# Patient Record
Sex: FEMALE | Race: WHITE | NOT HISPANIC OR LATINO | Employment: PART TIME | URBAN - METROPOLITAN AREA
[De-identification: names, ages, dates, MRNs, and addresses within clinical notes are randomized per-mention and may not be internally consistent; named-entity substitution may affect disease eponyms.]

---

## 2017-03-02 ENCOUNTER — APPOINTMENT (EMERGENCY)
Dept: RADIOLOGY | Facility: HOSPITAL | Age: 52
End: 2017-03-02
Payer: COMMERCIAL

## 2017-03-02 ENCOUNTER — HOSPITAL ENCOUNTER (EMERGENCY)
Facility: HOSPITAL | Age: 52
Discharge: HOME | End: 2017-03-02
Attending: EMERGENCY MEDICINE | Admitting: EMERGENCY MEDICINE
Payer: COMMERCIAL

## 2017-03-02 VITALS
RESPIRATION RATE: 18 BRPM | TEMPERATURE: 97.7 F | SYSTOLIC BLOOD PRESSURE: 135 MMHG | DIASTOLIC BLOOD PRESSURE: 65 MMHG | BODY MASS INDEX: 34.96 KG/M2 | HEART RATE: 52 BPM | WEIGHT: 190 LBS | OXYGEN SATURATION: 99 % | HEIGHT: 62 IN

## 2017-03-02 DIAGNOSIS — R10.9 ABDOMINAL PAIN: Primary | ICD-10-CM

## 2017-03-02 LAB
ALBUMIN SERPL BCP-MCNC: 3.9 G/DL (ref 3.5–5)
ALP SERPL-CCNC: 51 U/L (ref 46–116)
ALT SERPL W P-5'-P-CCNC: 35 U/L (ref 12–78)
ANION GAP SERPL CALCULATED.3IONS-SCNC: 10 MMOL/L (ref 4–13)
AST SERPL W P-5'-P-CCNC: 26 U/L (ref 5–45)
BASOPHILS # BLD AUTO: 0.1 THOUSANDS/ΜL (ref 0–0.1)
BASOPHILS NFR BLD AUTO: 1 % (ref 0–1)
BILIRUB SERPL-MCNC: 0.2 MG/DL (ref 0.2–1)
BILIRUB UR QL STRIP: NEGATIVE
BUN SERPL-MCNC: 10 MG/DL (ref 5–25)
CALCIUM SERPL-MCNC: 8.9 MG/DL (ref 8.3–10.1)
CHLORIDE SERPL-SCNC: 104 MMOL/L (ref 100–108)
CLARITY UR: CLEAR
CO2 SERPL-SCNC: 28 MMOL/L (ref 21–32)
COLOR UR: NORMAL
CREAT SERPL-MCNC: 0.57 MG/DL (ref 0.6–1.3)
EOSINOPHIL # BLD AUTO: 0.1 THOUSAND/ΜL (ref 0–0.61)
EOSINOPHIL NFR BLD AUTO: 2 % (ref 0–6)
ERYTHROCYTE [DISTWIDTH] IN BLOOD BY AUTOMATED COUNT: 13.3 % (ref 11.6–15.1)
GFR SERPL CREATININE-BSD FRML MDRD: >60 ML/MIN/1.73SQ M
GLUCOSE SERPL-MCNC: 94 MG/DL (ref 65–140)
GLUCOSE UR STRIP-MCNC: NEGATIVE MG/DL
HCT VFR BLD AUTO: 38.7 % (ref 37–47)
HGB BLD-MCNC: 12.8 G/DL (ref 12–16)
HGB UR QL STRIP.AUTO: NEGATIVE
KETONES UR STRIP-MCNC: NEGATIVE MG/DL
LEUKOCYTE ESTERASE UR QL STRIP: NEGATIVE
LIPASE SERPL-CCNC: 133 U/L (ref 73–393)
LYMPHOCYTES # BLD AUTO: 4.1 THOUSANDS/ΜL (ref 0.6–4.47)
LYMPHOCYTES NFR BLD AUTO: 43 % (ref 14–44)
MCH RBC QN AUTO: 28.9 PG (ref 27–31)
MCHC RBC AUTO-ENTMCNC: 33.2 G/DL (ref 31.4–37.4)
MCV RBC AUTO: 87 FL (ref 82–98)
MONOCYTES # BLD AUTO: 0.5 THOUSAND/ΜL (ref 0.17–1.22)
MONOCYTES NFR BLD AUTO: 5 % (ref 4–12)
NEUTROPHILS # BLD AUTO: 4.7 THOUSANDS/ΜL (ref 1.85–7.62)
NEUTS SEG NFR BLD AUTO: 50 % (ref 43–75)
NITRITE UR QL STRIP: NEGATIVE
NRBC BLD AUTO-RTO: 0 /100 WBCS
PH UR STRIP.AUTO: 6.5 [PH] (ref 5–9)
PLATELET # BLD AUTO: 295 THOUSANDS/UL (ref 130–400)
PMV BLD AUTO: 8.4 FL (ref 8.9–12.7)
POTASSIUM SERPL-SCNC: 3.8 MMOL/L (ref 3.5–5.3)
PROT SERPL-MCNC: 7.6 G/DL (ref 6.4–8.2)
PROT UR STRIP-MCNC: NEGATIVE MG/DL
RBC # BLD AUTO: 4.44 MILLION/UL (ref 4.2–5.4)
SODIUM SERPL-SCNC: 142 MMOL/L (ref 136–145)
SP GR UR STRIP.AUTO: 1.01 (ref 1–1.03)
UROBILINOGEN UR QL STRIP.AUTO: 0.2 E.U./DL
WBC # BLD AUTO: 9.5 THOUSAND/UL (ref 4.8–10.8)

## 2017-03-02 PROCEDURE — 85025 COMPLETE CBC W/AUTO DIFF WBC: CPT | Performed by: EMERGENCY MEDICINE

## 2017-03-02 PROCEDURE — 74177 CT ABD & PELVIS W/CONTRAST: CPT

## 2017-03-02 PROCEDURE — 36415 COLL VENOUS BLD VENIPUNCTURE: CPT | Performed by: EMERGENCY MEDICINE

## 2017-03-02 PROCEDURE — 96374 THER/PROPH/DIAG INJ IV PUSH: CPT

## 2017-03-02 PROCEDURE — 80053 COMPREHEN METABOLIC PANEL: CPT | Performed by: EMERGENCY MEDICINE

## 2017-03-02 PROCEDURE — 96361 HYDRATE IV INFUSION ADD-ON: CPT

## 2017-03-02 PROCEDURE — 83690 ASSAY OF LIPASE: CPT | Performed by: EMERGENCY MEDICINE

## 2017-03-02 PROCEDURE — 96375 TX/PRO/DX INJ NEW DRUG ADDON: CPT

## 2017-03-02 PROCEDURE — 81003 URINALYSIS AUTO W/O SCOPE: CPT | Performed by: EMERGENCY MEDICINE

## 2017-03-02 PROCEDURE — 99284 EMERGENCY DEPT VISIT MOD MDM: CPT

## 2017-03-02 PROCEDURE — 87086 URINE CULTURE/COLONY COUNT: CPT | Performed by: EMERGENCY MEDICINE

## 2017-03-02 RX ORDER — DICYCLOMINE HCL 20 MG
20 TABLET ORAL 2 TIMES DAILY
Qty: 16 TABLET | Refills: 0 | Status: SHIPPED | OUTPATIENT
Start: 2017-03-02 | End: 2017-10-31

## 2017-03-02 RX ORDER — SODIUM CHLORIDE 9 MG/ML
125 INJECTION, SOLUTION INTRAVENOUS CONTINUOUS
Status: DISCONTINUED | OUTPATIENT
Start: 2017-03-02 | End: 2017-03-02 | Stop reason: HOSPADM

## 2017-03-02 RX ORDER — ONDANSETRON 2 MG/ML
4 INJECTION INTRAMUSCULAR; INTRAVENOUS ONCE
Status: COMPLETED | OUTPATIENT
Start: 2017-03-02 | End: 2017-03-02

## 2017-03-02 RX ORDER — ONDANSETRON 4 MG/1
4 TABLET, FILM COATED ORAL EVERY 6 HOURS
Qty: 12 TABLET | Refills: 0 | Status: SHIPPED | OUTPATIENT
Start: 2017-03-02 | End: 2017-10-31

## 2017-03-02 RX ORDER — FLUOXETINE 10 MG/1
10 TABLET, FILM COATED ORAL DAILY
COMMUNITY
End: 2017-10-31

## 2017-03-02 RX ORDER — KETOROLAC TROMETHAMINE 30 MG/ML
30 INJECTION, SOLUTION INTRAMUSCULAR; INTRAVENOUS ONCE
Status: COMPLETED | OUTPATIENT
Start: 2017-03-02 | End: 2017-03-02

## 2017-03-02 RX ORDER — FAMOTIDINE 20 MG/1
20 TABLET, FILM COATED ORAL 2 TIMES DAILY
Qty: 20 TABLET | Refills: 0 | Status: SHIPPED | OUTPATIENT
Start: 2017-03-02 | End: 2017-10-31

## 2017-03-02 RX ORDER — NAPROXEN 500 MG/1
500 TABLET ORAL 2 TIMES DAILY WITH MEALS
Qty: 14 TABLET | Refills: 0 | Status: SHIPPED | OUTPATIENT
Start: 2017-03-02 | End: 2017-10-31

## 2017-03-02 RX ADMIN — SODIUM CHLORIDE 125 ML/HR: 0.9 INJECTION, SOLUTION INTRAVENOUS at 17:58

## 2017-03-02 RX ADMIN — KETOROLAC TROMETHAMINE 30 MG: 30 INJECTION, SOLUTION INTRAMUSCULAR at 18:04

## 2017-03-02 RX ADMIN — IOHEXOL 100 ML: 350 INJECTION, SOLUTION INTRAVENOUS at 18:44

## 2017-03-02 RX ADMIN — ONDANSETRON 4 MG: 2 INJECTION INTRAMUSCULAR; INTRAVENOUS at 18:04

## 2017-03-04 LAB — BACTERIA UR CULT: NORMAL

## 2017-10-31 ENCOUNTER — APPOINTMENT (EMERGENCY)
Dept: RADIOLOGY | Facility: HOSPITAL | Age: 52
End: 2017-10-31
Payer: COMMERCIAL

## 2017-10-31 ENCOUNTER — HOSPITAL ENCOUNTER (EMERGENCY)
Facility: HOSPITAL | Age: 52
Discharge: HOME | End: 2017-10-31
Attending: EMERGENCY MEDICINE | Admitting: EMERGENCY MEDICINE
Payer: COMMERCIAL

## 2017-10-31 VITALS
TEMPERATURE: 98 F | OXYGEN SATURATION: 96 % | RESPIRATION RATE: 16 BRPM | DIASTOLIC BLOOD PRESSURE: 56 MMHG | SYSTOLIC BLOOD PRESSURE: 119 MMHG | HEART RATE: 56 BPM

## 2017-10-31 DIAGNOSIS — R07.9 CHEST PAIN, UNSPECIFIED TYPE: Primary | ICD-10-CM

## 2017-10-31 LAB
AMPHETAMINES SERPL QL SCN: NEGATIVE
ANION GAP SERPL CALCULATED.3IONS-SCNC: 8 MMOL/L (ref 4–13)
APTT PPP: 28 SECONDS (ref 24–33)
BARBITURATES UR QL: NEGATIVE
BASOPHILS # BLD AUTO: 0 THOUSANDS/ΜL (ref 0–0.1)
BASOPHILS NFR BLD AUTO: 0 % (ref 0–1)
BENZODIAZ UR QL: NEGATIVE
BILIRUB UR QL STRIP: NEGATIVE
BUN SERPL-MCNC: 14 MG/DL (ref 5–25)
CALCIUM SERPL-MCNC: 9.1 MG/DL (ref 8.3–10.1)
CHLORIDE SERPL-SCNC: 105 MMOL/L (ref 100–108)
CLARITY UR: NORMAL
CO2 SERPL-SCNC: 27 MMOL/L (ref 21–32)
COCAINE UR QL: NEGATIVE
COLOR UR: YELLOW
CREAT SERPL-MCNC: 0.47 MG/DL (ref 0.6–1.3)
EOSINOPHIL # BLD AUTO: 0.1 THOUSAND/ΜL (ref 0–0.61)
EOSINOPHIL NFR BLD AUTO: 2 % (ref 0–6)
ERYTHROCYTE [DISTWIDTH] IN BLOOD BY AUTOMATED COUNT: 13.1 % (ref 11.6–15.1)
GFR SERPL CREATININE-BSD FRML MDRD: 116 ML/MIN/1.73SQ M
GLUCOSE SERPL-MCNC: 99 MG/DL (ref 65–140)
GLUCOSE UR STRIP-MCNC: NEGATIVE MG/DL
HCT VFR BLD AUTO: 38.9 % (ref 37–47)
HGB BLD-MCNC: 13 G/DL (ref 12–16)
HGB UR QL STRIP.AUTO: NEGATIVE
INR PPP: 0.97 (ref 0.86–1.16)
KETONES UR STRIP-MCNC: NEGATIVE MG/DL
LEUKOCYTE ESTERASE UR QL STRIP: NEGATIVE
LYMPHOCYTES # BLD AUTO: 2.8 THOUSANDS/ΜL (ref 0.6–4.47)
LYMPHOCYTES NFR BLD AUTO: 36 % (ref 14–44)
MAGNESIUM SERPL-MCNC: 2.1 MG/DL (ref 1.6–2.6)
MCH RBC QN AUTO: 29.1 PG (ref 27–31)
MCHC RBC AUTO-ENTMCNC: 33.3 G/DL (ref 31.4–37.4)
MCV RBC AUTO: 87 FL (ref 82–98)
METHADONE UR QL: NEGATIVE
MONOCYTES # BLD AUTO: 0.5 THOUSAND/ΜL (ref 0.17–1.22)
MONOCYTES NFR BLD AUTO: 6 % (ref 4–12)
NEUTROPHILS # BLD AUTO: 4.3 THOUSANDS/ΜL (ref 1.85–7.62)
NEUTS SEG NFR BLD AUTO: 56 % (ref 43–75)
NITRITE UR QL STRIP: NEGATIVE
NRBC BLD AUTO-RTO: 0 /100 WBCS
OPIATES UR QL SCN: NEGATIVE
PCP UR QL: NEGATIVE
PH UR STRIP.AUTO: 6 [PH] (ref 5–9)
PLATELET # BLD AUTO: 253 THOUSANDS/UL (ref 130–400)
PMV BLD AUTO: 8.9 FL (ref 8.9–12.7)
POTASSIUM SERPL-SCNC: 4 MMOL/L (ref 3.5–5.3)
PROT UR STRIP-MCNC: NEGATIVE MG/DL
PROTHROMBIN TIME: 10.2 SECONDS (ref 9.4–11.7)
RBC # BLD AUTO: 4.46 MILLION/UL (ref 4.2–5.4)
SODIUM SERPL-SCNC: 140 MMOL/L (ref 136–145)
SP GR UR STRIP.AUTO: <=1.005 (ref 1–1.03)
THC UR QL: NEGATIVE
TROPONIN I SERPL-MCNC: <0.02 NG/ML
TROPONIN I SERPL-MCNC: <0.02 NG/ML
UROBILINOGEN UR QL STRIP.AUTO: 0.2 E.U./DL
WBC # BLD AUTO: 7.8 THOUSAND/UL (ref 4.8–10.8)

## 2017-10-31 PROCEDURE — 85730 THROMBOPLASTIN TIME PARTIAL: CPT | Performed by: EMERGENCY MEDICINE

## 2017-10-31 PROCEDURE — 80307 DRUG TEST PRSMV CHEM ANLYZR: CPT | Performed by: EMERGENCY MEDICINE

## 2017-10-31 PROCEDURE — 36415 COLL VENOUS BLD VENIPUNCTURE: CPT | Performed by: EMERGENCY MEDICINE

## 2017-10-31 PROCEDURE — 83735 ASSAY OF MAGNESIUM: CPT | Performed by: EMERGENCY MEDICINE

## 2017-10-31 PROCEDURE — 85610 PROTHROMBIN TIME: CPT | Performed by: EMERGENCY MEDICINE

## 2017-10-31 PROCEDURE — 81003 URINALYSIS AUTO W/O SCOPE: CPT | Performed by: EMERGENCY MEDICINE

## 2017-10-31 PROCEDURE — 71010 HB CHEST X-RAY 1 VIEW FRONTAL (PORTABLE): CPT

## 2017-10-31 PROCEDURE — 84484 ASSAY OF TROPONIN QUANT: CPT | Performed by: EMERGENCY MEDICINE

## 2017-10-31 PROCEDURE — 93005 ELECTROCARDIOGRAM TRACING: CPT | Performed by: EMERGENCY MEDICINE

## 2017-10-31 PROCEDURE — 96360 HYDRATION IV INFUSION INIT: CPT

## 2017-10-31 PROCEDURE — 96361 HYDRATE IV INFUSION ADD-ON: CPT

## 2017-10-31 PROCEDURE — 80048 BASIC METABOLIC PNL TOTAL CA: CPT | Performed by: EMERGENCY MEDICINE

## 2017-10-31 PROCEDURE — 85025 COMPLETE CBC W/AUTO DIFF WBC: CPT | Performed by: EMERGENCY MEDICINE

## 2017-10-31 PROCEDURE — 99285 EMERGENCY DEPT VISIT HI MDM: CPT

## 2017-10-31 RX ORDER — SODIUM CHLORIDE 9 MG/ML
125 INJECTION, SOLUTION INTRAVENOUS CONTINUOUS
Status: DISCONTINUED | OUTPATIENT
Start: 2017-10-31 | End: 2017-10-31 | Stop reason: HOSPADM

## 2017-10-31 RX ADMIN — SODIUM CHLORIDE 125 ML/HR: 0.9 INJECTION, SOLUTION INTRAVENOUS at 10:18

## 2017-10-31 NOTE — ED PROVIDER NOTES
History  Chief Complaint   Patient presents with    Chest Pain     patient states she had chest pains last night  Denies chest pain at this time  Denies other symptoms  21-year-old female presents to the ED with chest pain which she stated was a pounding in her chest and she had increased pain when she took a deep breath versus normal reading  No fevers no chills no other problems states she had been to the gym earlier  Patient states there is no pain now she is awake and alert does have a history of a psychiatric disorder with anxiety and has recently stopped her medication for that  She questions whether this might be part of the problem  Patient is a past smoker does not currently smoke no other medical issues no other risk factors other than family history where her mom had MI at 64  History provided by:  Patient   used: No        None       Past Medical History:   Diagnosis Date    Psychiatric disorder     anxiety       Past Surgical History:   Procedure Laterality Date     SECTION      HYSTERECTOMY      TONSILLECTOMY         History reviewed  No pertinent family history  I have reviewed and agree with the history as documented  Social History   Substance Use Topics    Smoking status: Current Every Day Smoker     Types: E-Cigarettes    Smokeless tobacco: Never Used    Alcohol use No        Review of Systems   Constitutional: Negative for activity change, chills, diaphoresis and fever  HENT: Negative for congestion, ear pain, nosebleeds, sore throat, trouble swallowing and voice change  Eyes: Negative for pain, discharge and redness  Respiratory: Negative for apnea, cough, choking, shortness of breath, wheezing and stridor  Cardiovascular: Positive for chest pain  Negative for palpitations  Gastrointestinal: Negative for abdominal distention, abdominal pain, constipation, diarrhea, nausea and vomiting  Endocrine: Negative for polydipsia  Genitourinary: Negative for difficulty urinating, dysuria, flank pain, frequency, hematuria and urgency  Musculoskeletal: Negative for back pain, gait problem, joint swelling, myalgias, neck pain and neck stiffness  Skin: Negative for pallor and rash  Neurological: Negative for dizziness, tremors, syncope, speech difficulty, weakness, numbness and headaches  Hematological: Negative for adenopathy  Psychiatric/Behavioral: Negative for confusion, hallucinations, self-injury and suicidal ideas  The patient is not nervous/anxious  Physical Exam  ED Triage Vitals [10/31/17 0844]   Temperature Pulse Respirations Blood Pressure SpO2   98 °F (36 7 °C) 63 16 130/80 98 %      Temp Source Heart Rate Source Patient Position - Orthostatic VS BP Location FiO2 (%)   Oral Monitor Lying Right arm --      Pain Score       No Pain           Orthostatic Vital Signs  Vitals:    10/31/17 1030 10/31/17 1137 10/31/17 1230 10/31/17 1232   BP: 126/61 134/76 119/56 119/56   Pulse: 56 58 60 56   Patient Position - Orthostatic VS: Sitting Sitting Sitting Lying       Physical Exam   Constitutional: She is oriented to person, place, and time  Vital signs are normal  She appears well-developed and well-nourished  HENT:   Head: Normocephalic and atraumatic  Right Ear: External ear normal    Left Ear: External ear normal    Nose: Nose normal    Mouth/Throat: Oropharynx is clear and moist    Eyes: Conjunctivae and EOM are normal  Pupils are equal, round, and reactive to light  Neck: Normal range of motion  Neck supple  Cardiovascular: Normal rate, regular rhythm, normal heart sounds and intact distal pulses  Pulmonary/Chest: Effort normal and breath sounds normal    Abdominal: Soft  Bowel sounds are normal    Musculoskeletal: Normal range of motion  Neurological: She is alert and oriented to person, place, and time  Skin: Skin is warm  Psychiatric: She has a normal mood and affect     Nursing note and vitals reviewed  ED Medications  Medications   sodium chloride 0 9 % infusion (125 mL/hr Intravenous New Bag 10/31/17 1018)       Diagnostic Studies  Results Reviewed     Procedure Component Value Units Date/Time    Troponin I [[id number]]  (Normal) Collected:  10/31/17 1237    Lab Status:  Final result Specimen:  Blood from Arm, Right Updated:  10/31/17 1310     Troponin I <0 02 ng/mL     Narrative:         Siemens Chemistry analyzer 99% cutoff is > 0 04 ng/mL in network labs    o cTnI 99% cutoff is useful only when applied to patients in the clinical setting of myocardial ischemia  o cTnI 99% cutoff should be interpreted in the context of clinical history, ECG findings and possibly cardiac imaging to establish correct diagnosis  o cTnI 99% cutoff may be suggestive but clearly not indicative of a coronary event without the clinical setting of myocardial ischemia  Rapid drug screen, urine [[id number]]  (Normal) Collected:  10/31/17 1017    Lab Status:  Final result Specimen:  Urine from Urine, Clean Catch Updated:  10/31/17 1120     Amph/Meth UR Negative     Barbiturate Ur Negative     Benzodiazepine Urine Negative     Cocaine Urine Negative     Methadone Urine Negative     Opiate Urine Negative     PCP Ur Negative     THC Urine Negative    Narrative:         FOR MEDICAL PURPOSES ONLY  IF CONFIRMATION NEEDED PLEASE CONTACT THE LAB WITHIN 5 DAYS  Drug Screen Cutoff Levels:  AMPHETAMINE/METHAMPHETAMINES  1000 ng/mL  BARBITURATES     200 ng/mL  BENZODIAZEPINES     200 ng/mL  COCAINE      300 ng/mL  METHADONE      300 ng/mL  OPIATES      300 ng/mL  PHENCYCLIDINE     25 ng/mL  THC       50 ng/mL    APTT [[id number]]  (Normal) Collected:  10/31/17 0943    Lab Status:  Final result Specimen:  Blood from Hand, Left Updated:  10/31/17 1039     PTT 28 seconds     Narrative:          Therapeutic Heparin Range = 60-90 seconds    Protime-INR [[id number]]  (Normal) Collected:  10/31/17 0943    Lab Status:  Final result Specimen: Blood from Hand, Left Updated:  10/31/17 1039     Protime 10 2 seconds      INR 0 97    UA w Reflex to Microscopic [[id number]]  (Normal) Collected:  10/31/17 1017    Lab Status:  Final result Specimen:  Urine from Urine, Clean Catch Updated:  10/31/17 1038     Color, UA Yellow     Clarity, UA Slightly Cloudy     Specific Gravity, UA <=1 005     pH, UA 6 0     Leukocytes, UA Negative     Nitrite, UA Negative     Protein, UA Negative mg/dl      Glucose, UA Negative mg/dl      Ketones, UA Negative mg/dl      Urobilinogen, UA 0 2 E U /dl      Bilirubin, UA Negative     Blood, UA Negative    Troponin I [[id number]]  (Normal) Collected:  10/31/17 0943    Lab Status:  Final result Specimen:  Blood from Hand, Left Updated:  10/31/17 1017     Troponin I <0 02 ng/mL     Narrative:         Siemens Chemistry analyzer 99% cutoff is > 0 04 ng/mL in network labs    o cTnI 99% cutoff is useful only when applied to patients in the clinical setting of myocardial ischemia  o cTnI 99% cutoff should be interpreted in the context of clinical history, ECG findings and possibly cardiac imaging to establish correct diagnosis  o cTnI 99% cutoff may be suggestive but clearly not indicative of a coronary event without the clinical setting of myocardial ischemia  Basic metabolic panel [[id number]]  (Abnormal) Collected:  10/31/17 0943    Lab Status:  Final result Specimen:  Blood from Hand, Left Updated:  10/31/17 1007     Sodium 140 mmol/L      Potassium 4 0 mmol/L      Chloride 105 mmol/L      CO2 27 mmol/L      Anion Gap 8 mmol/L      BUN 14 mg/dL      Creatinine 0 47 (L) mg/dL      Glucose 99 mg/dL      Calcium 9 1 mg/dL      eGFR 116 ml/min/1 73sq m     Narrative:         National Kidney Disease Education Program recommendations are as follows:  GFR calculation is accurate only with a steady state creatinine  Chronic Kidney disease less than 60 ml/min/1 73 sq  meters  Kidney failure less than 15 ml/min/1 73 sq  meters      Magnesium [[id number]]  (Normal) Collected:  10/31/17 0943    Lab Status:  Final result Specimen:  Blood from Hand, Left Updated:  10/31/17 1007     Magnesium 2 1 mg/dL     CBC and differential [[id number]]  (Normal) Collected:  10/31/17 0943    Lab Status:  Final result Specimen:  Blood from Hand, Left Updated:  10/31/17 1005     WBC 7 80 Thousand/uL      RBC 4 46 Million/uL      Hemoglobin 13 0 g/dL      Hematocrit 38 9 %      MCV 87 fL      MCH 29 1 pg      MCHC 33 3 g/dL      RDW 13 1 %      MPV 8 9 fL      Platelets 180 Thousands/uL      nRBC 0 /100 WBCs      Neutrophils Relative 56 %      Lymphocytes Relative 36 %      Monocytes Relative 6 %      Eosinophils Relative 2 %      Basophils Relative 0 %      Neutrophils Absolute 4 30 Thousands/µL      Lymphocytes Absolute 2 80 Thousands/µL      Monocytes Absolute 0 50 Thousand/µL      Eosinophils Absolute 0 10 Thousand/µL      Basophils Absolute 0 00 Thousands/µL                  XR chest 1 view portable   Final Result by Hue Maldonado MD (10/31 1041)      Minimal scarring or atelectasis at the left lung base           Workstation performed: [id number]                    Procedures  Procedures       Phone Contacts  ED Phone Contact    ED Course  ED Course as of Oct 31 1317   Tue Oct 31, 2017   1127 Glucose, UA: Negative         HEART Risk Score    Flowsheet Row Most Recent Value   History  1 Filed at: 10/31/2017 1146   ECG  0 Filed at: 10/31/2017 1146   Age  1 Filed at: 10/31/2017 1146   Risk Factors  1 Filed at: 10/31/2017 1146   Troponin  0 Filed at: 10/31/2017 1146   Heart Score Risk Calculator   History  1 Filed at: 10/31/2017 1146   ECG  0 Filed at: 10/31/2017 1146   Age  1 Filed at: 10/31/2017 1146   Risk Factors  1 Filed at: 10/31/2017 1146   Troponin  0 Filed at: 10/31/2017 1146   HEART Score  3 Filed at: 10/31/2017 1146   HEART Score  3 Filed at: 10/31/2017 1146                            MDM  Number of Diagnoses or Management Options  Chest pain, unspecified type:   Diagnosis management comments: Patient has heart score of 3 year Less  Patient has had a 2nd troponin which is still negative and she has remained pain-free while in the ED  So she is comfortable with following up with Dr Emma Cooper as an outpatient  CritCare Time    Disposition  Final diagnoses:   Chest pain, unspecified type     Time reflects when diagnosis was documented in both MDM as applicable and the Disposition within this note     Time User Action Codes Description Comment    10/31/2017  1:13 PM Blaine Kulkarni Add [R07 9] Chest pain, unspecified type       ED Disposition     ED Disposition Condition Comment    Discharge  Wayne Dior discharge to home/self care  Condition at discharge: Stable        Follow-up Information     Follow up With Specialties Details Why Contact Info    Ramo Horan MD Internal Medicine  [facility] [address]  [phone]          Patient's Medications   Discharge Prescriptions    No medications on file     No discharge procedures on file      ED Provider  Electronically Signed by           Gurmeet Guadarrama DO  10/31/17 7566

## 2017-10-31 NOTE — ED TRIAGE NOTES
Patient states she was previously on Prozac  States she weaned herself off of it, because she does not like the word Prozac  Was given xanax, but does not take it

## 2017-10-31 NOTE — DISCHARGE INSTRUCTIONS

## 2017-11-02 LAB
ATRIAL RATE: 68 BPM
P AXIS: 81 DEGREES
PR INTERVAL: 126 MS
QRS AXIS: 51 DEGREES
QRSD INTERVAL: 78 MS
QT INTERVAL: 398 MS
QTC INTERVAL: 423 MS
T WAVE AXIS: 50 DEGREES
VENTRICULAR RATE: 68 BPM

## 2017-12-07 ENCOUNTER — TRANSCRIBE ORDERS (OUTPATIENT)
Dept: ADMINISTRATIVE | Facility: HOSPITAL | Age: 52
End: 2017-12-07

## 2017-12-07 DIAGNOSIS — Z12.31 ENCOUNTER FOR SCREENING MAMMOGRAM FOR MALIGNANT NEOPLASM OF BREAST: Primary | ICD-10-CM

## 2017-12-13 ENCOUNTER — GENERIC CONVERSION - ENCOUNTER (OUTPATIENT)
Dept: OTHER | Facility: OTHER | Age: 52
End: 2017-12-13

## 2017-12-13 ENCOUNTER — HOSPITAL ENCOUNTER (OUTPATIENT)
Dept: RADIOLOGY | Facility: HOSPITAL | Age: 52
Discharge: HOME | End: 2017-12-13
Attending: INTERNAL MEDICINE
Payer: COMMERCIAL

## 2017-12-13 DIAGNOSIS — Z12.31 ENCOUNTER FOR SCREENING MAMMOGRAM FOR MALIGNANT NEOPLASM OF BREAST: ICD-10-CM

## 2017-12-13 PROCEDURE — G0202 SCR MAMMO BI INCL CAD: HCPCS

## 2018-07-22 ENCOUNTER — APPOINTMENT (EMERGENCY)
Dept: RADIOLOGY | Facility: HOSPITAL | Age: 53
End: 2018-07-22
Payer: COMMERCIAL

## 2018-07-22 ENCOUNTER — HOSPITAL ENCOUNTER (EMERGENCY)
Facility: HOSPITAL | Age: 53
Discharge: HOME | End: 2018-07-22
Attending: EMERGENCY MEDICINE | Admitting: EMERGENCY MEDICINE
Payer: COMMERCIAL

## 2018-07-22 VITALS
OXYGEN SATURATION: 99 % | SYSTOLIC BLOOD PRESSURE: 134 MMHG | RESPIRATION RATE: 23 BRPM | WEIGHT: 191.8 LBS | DIASTOLIC BLOOD PRESSURE: 71 MMHG | TEMPERATURE: 98.1 F | BODY MASS INDEX: 35.08 KG/M2 | HEART RATE: 62 BPM

## 2018-07-22 DIAGNOSIS — R07.89 ATYPICAL CHEST PAIN: Primary | ICD-10-CM

## 2018-07-22 DIAGNOSIS — R09.1 PLEURISY: ICD-10-CM

## 2018-07-22 LAB
ALBUMIN SERPL BCP-MCNC: 3.7 G/DL (ref 3.5–5)
ALP SERPL-CCNC: 43 U/L (ref 46–116)
ALT SERPL W P-5'-P-CCNC: 39 U/L (ref 12–78)
ANION GAP SERPL CALCULATED.3IONS-SCNC: 4 MMOL/L (ref 4–13)
APTT PPP: 28 SECONDS (ref 24–33)
AST SERPL W P-5'-P-CCNC: 25 U/L (ref 5–45)
ATRIAL RATE: 75 BPM
BASOPHILS # BLD AUTO: 0.05 THOUSANDS/ΜL (ref 0–0.1)
BASOPHILS NFR BLD AUTO: 1 % (ref 0–1)
BILIRUB SERPL-MCNC: 0.3 MG/DL (ref 0.2–1)
BUN SERPL-MCNC: 11 MG/DL (ref 5–25)
CALCIUM SERPL-MCNC: 9.3 MG/DL (ref 8.3–10.1)
CHLORIDE SERPL-SCNC: 105 MMOL/L (ref 100–108)
CO2 SERPL-SCNC: 29 MMOL/L (ref 21–32)
CREAT SERPL-MCNC: 0.56 MG/DL (ref 0.6–1.3)
EOSINOPHIL # BLD AUTO: 0.09 THOUSAND/ΜL (ref 0–0.61)
EOSINOPHIL NFR BLD AUTO: 1 % (ref 0–6)
ERYTHROCYTE [DISTWIDTH] IN BLOOD BY AUTOMATED COUNT: 12.3 % (ref 11.6–15.1)
GFR SERPL CREATININE-BSD FRML MDRD: 108 ML/MIN/1.73SQ M
GLUCOSE SERPL-MCNC: 108 MG/DL (ref 65–140)
HCT VFR BLD AUTO: 39.7 % (ref 34.8–46.1)
HGB BLD-MCNC: 12.9 G/DL (ref 11.5–15.4)
IMM GRANULOCYTES # BLD AUTO: 0.03 THOUSAND/UL (ref 0–0.2)
IMM GRANULOCYTES NFR BLD AUTO: 0 % (ref 0–2)
INR PPP: 0.95 (ref 0.86–1.16)
LYMPHOCYTES # BLD AUTO: 2.89 THOUSANDS/ΜL (ref 0.6–4.47)
LYMPHOCYTES NFR BLD AUTO: 36 % (ref 14–44)
MCH RBC QN AUTO: 28.8 PG (ref 26.8–34.3)
MCHC RBC AUTO-ENTMCNC: 32.5 G/DL (ref 31.4–37.4)
MCV RBC AUTO: 89 FL (ref 82–98)
MONOCYTES # BLD AUTO: 0.63 THOUSAND/ΜL (ref 0.17–1.22)
MONOCYTES NFR BLD AUTO: 8 % (ref 4–12)
NEUTROPHILS # BLD AUTO: 4.27 THOUSANDS/ΜL (ref 1.85–7.62)
NEUTS SEG NFR BLD AUTO: 54 % (ref 43–75)
NRBC BLD AUTO-RTO: 0 /100 WBCS
P AXIS: 70 DEGREES
PLATELET # BLD AUTO: 270 THOUSANDS/UL (ref 149–390)
PMV BLD AUTO: 10.5 FL (ref 8.9–12.7)
POTASSIUM SERPL-SCNC: 3.6 MMOL/L (ref 3.5–5.3)
PR INTERVAL: 122 MS
PROT SERPL-MCNC: 7.7 G/DL (ref 6.4–8.2)
PROTHROMBIN TIME: 10 SECONDS (ref 9.4–11.7)
QRS AXIS: 44 DEGREES
QRSD INTERVAL: 78 MS
QT INTERVAL: 382 MS
QTC INTERVAL: 426 MS
RBC # BLD AUTO: 4.48 MILLION/UL (ref 3.81–5.12)
SODIUM SERPL-SCNC: 138 MMOL/L (ref 136–145)
T WAVE AXIS: 63 DEGREES
TROPONIN I SERPL-MCNC: <0.02 NG/ML
VENTRICULAR RATE: 75 BPM
WBC # BLD AUTO: 7.96 THOUSAND/UL (ref 4.31–10.16)

## 2018-07-22 PROCEDURE — 36415 COLL VENOUS BLD VENIPUNCTURE: CPT

## 2018-07-22 PROCEDURE — 74175 CTA ABDOMEN W/CONTRAST: CPT

## 2018-07-22 PROCEDURE — 84484 ASSAY OF TROPONIN QUANT: CPT

## 2018-07-22 PROCEDURE — 85610 PROTHROMBIN TIME: CPT

## 2018-07-22 PROCEDURE — 96361 HYDRATE IV INFUSION ADD-ON: CPT

## 2018-07-22 PROCEDURE — 99285 EMERGENCY DEPT VISIT HI MDM: CPT

## 2018-07-22 PROCEDURE — 80053 COMPREHEN METABOLIC PANEL: CPT

## 2018-07-22 PROCEDURE — 96360 HYDRATION IV INFUSION INIT: CPT

## 2018-07-22 PROCEDURE — 85730 THROMBOPLASTIN TIME PARTIAL: CPT

## 2018-07-22 PROCEDURE — 93005 ELECTROCARDIOGRAM TRACING: CPT

## 2018-07-22 PROCEDURE — 93010 ELECTROCARDIOGRAM REPORT: CPT | Performed by: INTERNAL MEDICINE

## 2018-07-22 PROCEDURE — 85025 COMPLETE CBC W/AUTO DIFF WBC: CPT

## 2018-07-22 PROCEDURE — 71275 CT ANGIOGRAPHY CHEST: CPT

## 2018-07-22 RX ORDER — NAPROXEN 500 MG/1
500 TABLET ORAL 2 TIMES DAILY WITH MEALS
Qty: 10 TABLET | Refills: 0 | Status: SHIPPED | OUTPATIENT
Start: 2018-07-22 | End: 2018-07-31 | Stop reason: SDUPTHER

## 2018-07-22 RX ORDER — NAPROXEN 500 MG/1
500 TABLET ORAL ONCE
Status: COMPLETED | OUTPATIENT
Start: 2018-07-22 | End: 2018-07-22

## 2018-07-22 RX ADMIN — SODIUM CHLORIDE 1000 ML: 0.9 INJECTION, SOLUTION INTRAVENOUS at 16:10

## 2018-07-22 RX ADMIN — NAPROXEN 500 MG: 500 TABLET ORAL at 18:27

## 2018-07-22 RX ADMIN — IOHEXOL 100 ML: 350 INJECTION, SOLUTION INTRAVENOUS at 16:45

## 2018-07-22 NOTE — ED NOTES
Patient was transferred via stretcher to CT and returned via stretcher to room from CT at this time       Bhargav Yang RN  07/22/18 4529

## 2018-07-22 NOTE — DISCHARGE INSTRUCTIONS
Pleurisy   WHAT YOU NEED TO KNOW:   Pleurisy happens when the pleura becomes irritated or inflamed  The pleura are 2 thin layers of tissue that surround your lungs and line the inside of your chest cavity  There is a small amount of fluid between the pleura that helps the layers move easily when you breathe  When the pleura is irritated or swollen, the layers rub together as you breathe  DISCHARGE INSTRUCTIONS:   Return to the emergency department if:   · You have a fever  · You have shortness of breath  · Your lips or fingernails turn dusky or blue  · You have sudden, intense chest pain that feels different from your symptoms  · You are confused or feel like you are going to faint  Contact your healthcare provider if:   · Your pain gets worse, even after treatment  · You begin to cough up yellow, green, gray, or bloody mucus  · Your wound has redness, warmth, or drainage  · You have questions or concerns about your condition or care  Medicines: You may  receive any of the following:  · Cough medicine  helps decrease your urge to cough  A cough suppressant may help if a dry cough is causing you pain  · Antibiotics  are used if your pleurisy is caused by a bacteria  · Steroids  may be given to decrease inflammation  · NSAIDs , such as ibuprofen, help decrease swelling, pain, and fever  · Prescription pain medicine  may be given to decrease severe pain if other pain medicines do not work  · Tylenol may be used as needed for pain  Self-care:   · Splint your pain when coughing  Hold a pillow or folded blanket tightly over your chest when you cough or take a deep breath  · Find a comfortable position  that allows you to decrease pain and breathe easier  You may find it comfortable to lie on your the side that has pleurisy  Change your position frequently to prevent complications, such as worsening pneumonia or lung collapse  · Do not smoke    Nicotine and other chemicals in cigarettes and cigars can cause lung damage  Ask your healthcare provider for information if you currently smoke and need help to quit  E-cigarettes or smokeless tobacco still contain nicotine  Talk to your healthcare provider before you use these products  Prevention:   · Get early treatment  for conditions that cause pleurisy  · Get vaccinated  Ask your healthcare provider if you should get a flu and pneumonia vaccine  These vaccines may prevent infections that cause pleurisy  Follow up with your healthcare provider as directed:  Write down your questions so you remember to ask them during your visits  © 2017 [facility] Information is for End User's use only and may not be sold, redistributed or otherwise used for commercial purposes  All illustrations and images included in CareNotes® are the copyrighted property of A D A M , Inc  or Farshad Saeed  The above information is an  only  It is not intended as medical advice for individual conditions or treatments  Talk to your doctor, nurse or pharmacist before following any medical regimen to see if it is safe and effective for you

## 2018-07-22 NOTE — ED PROVIDER NOTES
History  Chief Complaint   Patient presents with    Chest Pain     pt c/o l sided shooting chest pain started this am around 0500 on and off with some nausea  upon arrival pt denies any pain     53-year-old female presents to the ED with chest pain which goes from the left side of her chest through directly into her left side of her back  States this started over the last day or so she has been getting sweaty and occasionally little short of breath with it  No fevers no chills no other complaints patient does have a history of anxiety which she said there is a possibility could be anxiety  acting up again  Patient denies any other medical issues however states she vapes, has high cholesterol and high blood pressure  History provided by:  Patient   used: No        None       Past Medical History:   Diagnosis Date    Anxiety     Psychiatric disorder     anxiety       Past Surgical History:   Procedure Laterality Date     SECTION      HYSTERECTOMY      TONSILLECTOMY         History reviewed  No pertinent family history  I have reviewed and agree with the history as documented  Social History   Substance Use Topics    Smoking status: Light Tobacco Smoker     Types: E-Cigarettes    Smokeless tobacco: Never Used      Comment: vape    Alcohol use No        Review of Systems   Constitutional: Negative for activity change, chills, diaphoresis and fever  HENT: Negative for congestion, ear pain, nosebleeds, sore throat, trouble swallowing and voice change  Eyes: Negative for pain, discharge and redness  Respiratory: Negative for apnea, cough, choking, shortness of breath, wheezing and stridor  Cardiovascular: Positive for chest pain  Negative for palpitations  Gastrointestinal: Negative for abdominal distention, abdominal pain, constipation, diarrhea, nausea and vomiting  Endocrine: Negative for polydipsia     Genitourinary: Negative for difficulty urinating, dysuria, flank pain, frequency, hematuria and urgency  Musculoskeletal: Negative for back pain, gait problem, joint swelling, myalgias, neck pain and neck stiffness  Skin: Negative for pallor and rash  Neurological: Negative for dizziness, tremors, syncope, speech difficulty, weakness, numbness and headaches  Hematological: Negative for adenopathy  Psychiatric/Behavioral: Negative for confusion, hallucinations, self-injury and suicidal ideas  The patient is not nervous/anxious  Physical Exam  Physical Exam   Constitutional: She is oriented to person, place, and time  Vital signs are normal  She appears well-developed and well-nourished  HENT:   Head: Normocephalic and atraumatic  Eyes: Conjunctivae and EOM are normal  Pupils are equal, round, and reactive to light  Neck: Normal range of motion  Neck supple  Cardiovascular: Normal rate, regular rhythm, normal heart sounds and intact distal pulses  Pulmonary/Chest: Effort normal and breath sounds normal    Abdominal: Soft  Bowel sounds are normal    Musculoskeletal: Normal range of motion  Neurological: She is alert and oriented to person, place, and time  Skin: Skin is warm  Psychiatric: She has a normal mood and affect  Nursing note and vitals reviewed        Vital Signs  ED Triage Vitals   Temperature Pulse Respirations Blood Pressure SpO2   07/22/18 1535 07/22/18 1535 07/22/18 1535 07/22/18 1535 07/22/18 1535   98 1 °F (36 7 °C) 75 18 134/71 96 %      Temp Source Heart Rate Source Patient Position - Orthostatic VS BP Location FiO2 (%)   07/22/18 1535 07/22/18 1535 07/22/18 1535 07/22/18 1535 --   Tympanic Monitor Lying Right arm       Pain Score       07/22/18 1827       2           Vitals:    07/22/18 1715 07/22/18 1730 07/22/18 1745 07/22/18 1800   BP:       Pulse: 64 58 60 62   Patient Position - Orthostatic VS:           Visual Acuity      ED Medications  Medications   sodium chloride 0 9 % bolus 1,000 mL (0 mL Intravenous Stopped 7/22/18 1826)   iohexol (OMNIPAQUE) 350 MG/ML injection (MULTI-DOSE) 100 mL (100 mL Intravenous Given 7/22/18 1645)   naproxen (NAPROSYN) tablet 500 mg (500 mg Oral Given 7/22/18 1827)       Diagnostic Studies  Results Reviewed     Procedure Component Value Units Date/Time    Comprehensive metabolic panel [[id number]]  (Abnormal) Collected:  07/22/18 1543    Lab Status:  Final result Specimen:  Blood from Arm, Left Updated:  07/22/18 1625     Sodium 138 mmol/L      Potassium 3 6 mmol/L      Chloride 105 mmol/L      CO2 29 mmol/L      Anion Gap 4 mmol/L      BUN 11 mg/dL      Creatinine 0 56 (L) mg/dL      Glucose 108 mg/dL      Calcium 9 3 mg/dL      AST 25 U/L      ALT 39 U/L      Alkaline Phosphatase 43 (L) U/L      Total Protein 7 7 g/dL      Albumin 3 7 g/dL      Total Bilirubin 0 30 mg/dL      eGFR 108 ml/min/1 73sq m     Narrative:         National Kidney Disease Education Program recommendations are as follows:  GFR calculation is accurate only with a steady state creatinine  Chronic Kidney disease less than 60 ml/min/1 73 sq  meters  Kidney failure less than 15 ml/min/1 73 sq  meters      APTT [[id number]]  (Normal) Collected:  07/22/18 1543    Lab Status:  Final result Specimen:  Blood from Arm, Left Updated:  07/22/18 1619     PTT 28 seconds     Protime-INR [[id number]]  (Normal) Collected:  07/22/18 1543    Lab Status:  Final result Specimen:  Blood from Arm, Left Updated:  07/22/18 1619     Protime 10 0 seconds      INR 0 95    Troponin I [[id number]]  (Normal) Collected:  07/22/18 1543    Lab Status:  Final result Specimen:  Blood from Arm, Left Updated:  07/22/18 1618     Troponin I <0 02 ng/mL     CBC and differential [[id number]] Collected:  07/22/18 1543    Lab Status:  Final result Specimen:  Blood from Arm, Left Updated:  07/22/18 1550     WBC 7 96 Thousand/uL      RBC 4 48 Million/uL      Hemoglobin 12 9 g/dL      Hematocrit 39 7 %      MCV 89 fL      MCH 28 8 pg      MCHC 32 5 g/dL      RDW 12 3 % MPV 10 5 fL      Platelets 051 Thousands/uL      nRBC 0 /100 WBCs      Neutrophils Relative 54 %      Immat GRANS % 0 %      Lymphocytes Relative 36 %      Monocytes Relative 8 %      Eosinophils Relative 1 %      Basophils Relative 1 %      Neutrophils Absolute 4 27 Thousands/µL      Immature Grans Absolute 0 03 Thousand/uL      Lymphocytes Absolute 2 89 Thousands/µL      Monocytes Absolute 0 63 Thousand/µL      Eosinophils Absolute 0 09 Thousand/µL      Basophils Absolute 0 05 Thousands/µL                  CTA dissection protocol chest and abdomen   Final Result by Fan Alarcon MD (07/22 1717)   1  No acute findings in the abdomen or pelvis  Specifically, negative for acute aortic pathology  2       Workstation performed: [id number]                    Procedures  Procedures       Phone Contacts  ED Phone Contact    ED Course                               MDM  CritCare Time    Disposition  Final diagnoses:   Atypical chest pain   Pleurisy     Time reflects when diagnosis was documented in both MDM as applicable and the Disposition within this note     Time User Action Codes Description Comment    4/28/4794  7:33 PM Jaleesa Needle Add [B01 56] Atypical chest pain     0/91/9231  6:35 PM Suha Grayson A Add [W81 4] Pleurisy       ED Disposition     ED Disposition Condition Comment    Discharge  Pearla Cure discharge to home/self care  Condition at discharge: Stable        Follow-up Information     Follow up With Specialties Details Why Contact Info    your doctor              Discharge Medication List as of 7/22/2018  6:21 PM      START taking these medications    Details   naproxen (NAPROSYN) 500 mg tablet Take 1 tablet (500 mg total) by mouth 2 (two) times a day with meals, Starting Sun 7/22/2018, Print           No discharge procedures on file      ED Provider  Electronically Signed by           Abhijit Lucas DO  07/23/18 7838

## 2018-07-22 NOTE — ED CARE HANDOFF
Emergency Department Sign Out Note        Sign out and transfer of care from *Dr Juventino Chatterjee**  See Separate Emergency Department note  The patient, Trevin Herman, was evaluated by the previous provider for *chest pain**  Workup Completed:  *ekg, labs**    ED Course / Workup Pending (followup):  **CTA*                             Procedures  [facility]  Number of Diagnoses or Management Options  Diagnosis management comments: Evaluation benign  Will discharge  Discussed with pt  She will follow up outpt  Or return here if severe or ugent worsening  Try course of naprosyn, warm compress, tylenol prn  CritCare Time      Disposition  Final diagnoses:   Atypical chest pain   Pleurisy     Time reflects when diagnosis was documented in both MDM as applicable and the Disposition within this note     Time User Action Codes Description Comment    1/09/8651  5:25 PM Marianela Dickey Add [K75 43] Atypical chest pain     5/42/6250  0:97 PM Krishna LIN Add [Z55 9] Pleurisy       ED Disposition     ED Disposition Condition Comment    Discharge  Trevin Herman discharge to home/self care  Condition at discharge: Stable        Follow-up Information     Follow up With Specialties Details Why Contact Info    your doctor            Patient's Medications   Discharge Prescriptions    NAPROXEN (NAPROSYN) 500 MG TABLET    Take 1 tablet (500 mg total) by mouth 2 (two) times a day with meals       Start Date: 7/22/2018 End Date: --       Order Dose: 500 mg       Quantity: 10 tablet    Refills: 0     No discharge procedures on file         ED Provider  Electronically Signed by     Vivienne Wolfe MD  97/51/07 7161

## 2018-07-24 ENCOUNTER — VBI (OUTPATIENT)
Dept: FAMILY MEDICINE CLINIC | Facility: CLINIC | Age: 53
End: 2018-07-24

## 2018-07-24 NOTE — TELEPHONE ENCOUNTER
Pt was seen in [address] on 7/22/18  CC; Chest Pain  DX: Chest pain  [facility] is not PCP for this pt

## 2018-07-30 ENCOUNTER — HOSPITAL ENCOUNTER (EMERGENCY)
Facility: HOSPITAL | Age: 53
Discharge: HOME | End: 2018-07-30
Attending: EMERGENCY MEDICINE | Admitting: EMERGENCY MEDICINE
Payer: COMMERCIAL

## 2018-07-30 ENCOUNTER — APPOINTMENT (EMERGENCY)
Dept: RADIOLOGY | Facility: HOSPITAL | Age: 53
End: 2018-07-30
Payer: COMMERCIAL

## 2018-07-30 VITALS
TEMPERATURE: 98 F | DIASTOLIC BLOOD PRESSURE: 56 MMHG | RESPIRATION RATE: 17 BRPM | OXYGEN SATURATION: 93 % | HEART RATE: 56 BPM | SYSTOLIC BLOOD PRESSURE: 104 MMHG

## 2018-07-30 DIAGNOSIS — R07.89 ATYPICAL CHEST PAIN: Primary | ICD-10-CM

## 2018-07-30 LAB
ALBUMIN SERPL BCP-MCNC: 4 G/DL (ref 3.5–5)
ALP SERPL-CCNC: 50 U/L (ref 46–116)
ALT SERPL W P-5'-P-CCNC: 36 U/L (ref 12–78)
ANION GAP SERPL CALCULATED.3IONS-SCNC: 8 MMOL/L (ref 4–13)
AST SERPL W P-5'-P-CCNC: 26 U/L (ref 5–45)
BASOPHILS # BLD AUTO: 0.05 THOUSANDS/ΜL (ref 0–0.1)
BASOPHILS NFR BLD AUTO: 1 % (ref 0–1)
BILIRUB SERPL-MCNC: 0.2 MG/DL (ref 0.2–1)
BUN SERPL-MCNC: 14 MG/DL (ref 5–25)
CALCIUM SERPL-MCNC: 9.2 MG/DL (ref 8.3–10.1)
CHLORIDE SERPL-SCNC: 104 MMOL/L (ref 100–108)
CO2 SERPL-SCNC: 30 MMOL/L (ref 21–32)
CREAT SERPL-MCNC: 0.62 MG/DL (ref 0.6–1.3)
EOSINOPHIL # BLD AUTO: 0.08 THOUSAND/ΜL (ref 0–0.61)
EOSINOPHIL NFR BLD AUTO: 1 % (ref 0–6)
ERYTHROCYTE [DISTWIDTH] IN BLOOD BY AUTOMATED COUNT: 12.3 % (ref 11.6–15.1)
GFR SERPL CREATININE-BSD FRML MDRD: 105 ML/MIN/1.73SQ M
GLUCOSE SERPL-MCNC: 94 MG/DL (ref 65–140)
HCT VFR BLD AUTO: 40.1 % (ref 34.8–46.1)
HGB BLD-MCNC: 13.1 G/DL (ref 11.5–15.4)
IMM GRANULOCYTES # BLD AUTO: 0.03 THOUSAND/UL (ref 0–0.2)
IMM GRANULOCYTES NFR BLD AUTO: 0 % (ref 0–2)
LYMPHOCYTES # BLD AUTO: 3.13 THOUSANDS/ΜL (ref 0.6–4.47)
LYMPHOCYTES NFR BLD AUTO: 32 % (ref 14–44)
MCH RBC QN AUTO: 28.9 PG (ref 26.8–34.3)
MCHC RBC AUTO-ENTMCNC: 32.7 G/DL (ref 31.4–37.4)
MCV RBC AUTO: 89 FL (ref 82–98)
MONOCYTES # BLD AUTO: 0.63 THOUSAND/ΜL (ref 0.17–1.22)
MONOCYTES NFR BLD AUTO: 7 % (ref 4–12)
NEUTROPHILS # BLD AUTO: 5.81 THOUSANDS/ΜL (ref 1.85–7.62)
NEUTS SEG NFR BLD AUTO: 59 % (ref 43–75)
NRBC BLD AUTO-RTO: 0 /100 WBCS
PLATELET # BLD AUTO: 282 THOUSANDS/UL (ref 149–390)
PMV BLD AUTO: 10.9 FL (ref 8.9–12.7)
POTASSIUM SERPL-SCNC: 3.6 MMOL/L (ref 3.5–5.3)
PROT SERPL-MCNC: 7.9 G/DL (ref 6.4–8.2)
RBC # BLD AUTO: 4.53 MILLION/UL (ref 3.81–5.12)
SODIUM SERPL-SCNC: 142 MMOL/L (ref 136–145)
TROPONIN I SERPL-MCNC: <0.02 NG/ML
TSH SERPL DL<=0.05 MIU/L-ACNC: 1.87 UIU/ML (ref 0.36–3.74)
WBC # BLD AUTO: 9.73 THOUSAND/UL (ref 4.31–10.16)

## 2018-07-30 PROCEDURE — 84443 ASSAY THYROID STIM HORMONE: CPT | Performed by: EMERGENCY MEDICINE

## 2018-07-30 PROCEDURE — 85025 COMPLETE CBC W/AUTO DIFF WBC: CPT | Performed by: EMERGENCY MEDICINE

## 2018-07-30 PROCEDURE — 84484 ASSAY OF TROPONIN QUANT: CPT | Performed by: EMERGENCY MEDICINE

## 2018-07-30 PROCEDURE — 71045 X-RAY EXAM CHEST 1 VIEW: CPT

## 2018-07-30 PROCEDURE — 36415 COLL VENOUS BLD VENIPUNCTURE: CPT | Performed by: EMERGENCY MEDICINE

## 2018-07-30 PROCEDURE — 99285 EMERGENCY DEPT VISIT HI MDM: CPT

## 2018-07-30 PROCEDURE — 80053 COMPREHEN METABOLIC PANEL: CPT | Performed by: EMERGENCY MEDICINE

## 2018-07-30 PROCEDURE — 93005 ELECTROCARDIOGRAM TRACING: CPT

## 2018-07-30 NOTE — DISCHARGE INSTRUCTIONS
Your workup in the emergency department was normal  She should follow up with your appointment with the cardiologist   Your thyroid was normal if any worsening of symptoms please return      Chest Wall Pain, Ambulatory Care   GENERAL INFORMATION:   Chest wall pain  may be caused by problems with the muscles, cartilage, or bones of the chest wall  Chest wall pain may also be caused by pain that spreads to your chest from another part of your body  The pain may be aching, severe, dull, or sharp  It may come and go, or it may be constant  The pain may be worse when you move in certain ways, breathe deeply, or cough  Seek immediate care for the following symptoms:   · Severe pain    · You have any of the following signs of a heart attack:      ¨ Squeezing, pressure, or pain in your chest that lasts longer than 5 minutes or returns    ¨ Discomfort or pain in your back, neck, jaw, stomach, or arm     ¨ Trouble breathing    ¨ Nausea or vomiting    ¨ Lightheadedness or a sudden cold sweat, especially with chest pain or trouble breathing  Treatment  depends on the cause of your chest wall pain  You may need any of the following:  · NSAIDs  help decrease swelling and pain or fever  This medicine is available with or without a doctor's order  NSAIDs can cause stomach bleeding or kidney problems in certain people  If you take blood thinner medicine, always ask your healthcare provider if NSAIDs are safe for you  Always read the medicine label and follow directions  · Acetaminophen  decreases pain  It is available without a doctor's order  Ask how much to take and how often to take it  Follow directions  Acetaminophen can cause liver damage if not taken correctly  · Apply heat  on your chest for 20 to 30 minutes every 2 hours for as many days as directed  Heat helps decrease pain and muscle spasms  · Apply ice  on your chest for 15 to 20 minutes every hour or as directed   Use an ice pack, or put crushed ice in a plastic bag  Cover it with a towel  Ice helps prevent tissue damage and decreases swelling and pain  Follow up with your healthcare provider as directed:  Write down your questions so you remember to ask them during your visits  CARE AGREEMENT:   You have the right to help plan your care  Learn about your health condition and how it may be treated  Discuss treatment options with your caregivers to decide what care you want to receive  You always have the right to refuse treatment  The above information is an  only  It is not intended as medical advice for individual conditions or treatments  Talk to your doctor, nurse or pharmacist before following any medical regimen to see if it is safe and effective for you  © 2014 [address] is for End User's use only and may not be sold, redistributed or otherwise used for commercial purposes  All illustrations and images included in CareNotes® are the copyrighted property of [employer]  or Farshad Saeed

## 2018-07-30 NOTE — ED PROVIDER NOTES
History  Chief Complaint   Patient presents with    Chest Pain     pt presents to the ed with chest pain on and off for several days  pt states that she has left sided arm tingling      HPI    This is a 48 year female that presents today with chest pain  Patient has history of anxiety and states for the past several days she has been having chest pain which comes and goes feels sharp and sensation and goes up to neck and down her left arm  Patient was seen here few days prior regarding same exact presentation she had dissection study performed which was negative and a workup which was negative  Patient states she has been taking ibuprofen for pain has not been helping  She has the cardiologist tomorrow  States she wants to get checked again  She does have strong family history of cardiac disease in their 46s  Denies smoking history  No history of diabetes hypertension hyperlipidemia  Denies any drugs or alcohol  No prior history of MI  No corerlation of the pain with resting or exertion  No trauma  Prior to Admission Medications   Prescriptions Last Dose Informant Patient Reported? Taking?   naproxen (NAPROSYN) 500 mg tablet   No No   Sig: Take 1 tablet (500 mg total) by mouth 2 (two) times a day with meals      Facility-Administered Medications: None       Past Medical History:   Diagnosis Date    Anxiety     Psychiatric disorder     anxiety       Past Surgical History:   Procedure Laterality Date     SECTION      HYSTERECTOMY      TONSILLECTOMY         History reviewed  No pertinent family history  I have reviewed and agree with the history as documented  Social History   Substance Use Topics    Smoking status: Light Tobacco Smoker     Types: E-Cigarettes    Smokeless tobacco: Never Used      Comment: vape    Alcohol use No        Review of Systems   Constitutional: Negative  Negative for diaphoresis and fever  HENT: Negative  Respiratory: Negative    Negative for cough, shortness of breath and wheezing  Cardiovascular: Positive for chest pain  Negative for palpitations and leg swelling  Gastrointestinal: Negative for abdominal distention, abdominal pain, nausea and vomiting  Genitourinary: Negative  Musculoskeletal: Negative  Skin: Negative  Neurological: Negative  Psychiatric/Behavioral: Negative  All other systems reviewed and are negative  Physical Exam  Physical Exam   Constitutional: She is oriented to person, place, and time  She appears well-developed and well-nourished  No distress  HENT:   Head: Normocephalic and atraumatic  Mouth/Throat: Oropharynx is clear and moist    Eyes: Conjunctivae and EOM are normal  Pupils are equal, round, and reactive to light  Neck: Normal range of motion  Neck supple  Cardiovascular: Normal rate, regular rhythm and normal heart sounds  No murmur heard  Pulmonary/Chest: Effort normal and breath sounds normal  No respiratory distress  She has no wheezes  Abdominal: Soft  Bowel sounds are normal  She exhibits no distension  There is no tenderness  There is no rebound and no guarding  Musculoskeletal: Normal range of motion  She exhibits no edema or deformity  Neurological: She is alert and oriented to person, place, and time  Skin: Skin is warm and dry  Capillary refill takes less than 2 seconds  No rash noted  She is not diaphoretic  Psychiatric: She has a normal mood and affect  Vitals reviewed        Vital Signs  ED Triage Vitals [07/30/18 1519]   Temperature Pulse Respirations Blood Pressure SpO2   98 °F (36 7 °C) 71 18 (!) 181/67 95 %      Temp src Heart Rate Source Patient Position - Orthostatic VS BP Location FiO2 (%)   -- Monitor -- -- --      Pain Score       --           Vitals:    07/30/18 1519 07/30/18 1715 07/30/18 1745 07/30/18 1800   BP: (!) 181/67   104/56   Pulse: 71 62 56        Visual Acuity      ED Medications  Medications - No data to display    Diagnostic Studies  Results Reviewed     Procedure Component Value Units Date/Time    TSH, 3rd generation with Free T4 reflex [[id number]]  (Normal) Collected:  07/30/18 1541    Lab Status:  Final result Specimen:  Blood from Arm, Left Updated:  07/30/18 1733     TSH 3RD GENERATON 1 870 uIU/mL     Narrative:         Patients undergoing fluorescein dye angiography may retain small amounts of fluorescein in the body for 48-72 hours post procedure  Samples containing fluorescein can produce falsely depressed TSH values  If the patient had this procedure,a specimen should be resubmitted post fluorescein clearance  The recommended reference ranges for TSH during pregnancy are as follows:  First trimester 0 1 to 2 5 uIU/mL  Second trimester  0 2 to 3 0 uIU/mL  Third trimester 0 3 to 3 0 uIU/m      Troponin I [[id number]]  (Normal) Collected:  07/30/18 1541    Lab Status:  Final result Specimen:  Blood from Arm, Left Updated:  07/30/18 1609     Troponin I <0 02 ng/mL     Comprehensive metabolic panel [[id number]] Collected:  07/30/18 1541    Lab Status:  Final result Specimen:  Blood from Arm, Left Updated:  07/30/18 1604     Sodium 142 mmol/L      Potassium 3 6 mmol/L      Chloride 104 mmol/L      CO2 30 mmol/L      Anion Gap 8 mmol/L      BUN 14 mg/dL      Creatinine 0 62 mg/dL      Glucose 94 mg/dL      Calcium 9 2 mg/dL      AST 26 U/L      ALT 36 U/L      Alkaline Phosphatase 50 U/L      Total Protein 7 9 g/dL      Albumin 4 0 g/dL      Total Bilirubin 0 20 mg/dL      eGFR 105 ml/min/1 73sq m     Narrative:         National Kidney Disease Education Program recommendations are as follows:  GFR calculation is accurate only with a steady state creatinine  Chronic Kidney disease less than 60 ml/min/1 73 sq  meters  Kidney failure less than 15 ml/min/1 73 sq  meters      CBC and differential [[id number]] Collected:  07/30/18 1541    Lab Status:  Final result Specimen:  Blood from Arm, Left Updated:  07/30/18 1549     WBC 9 73 Thousand/uL      RBC 4 53 Million/uL      Hemoglobin 13 1 g/dL      Hematocrit 40 1 %      MCV 89 fL      MCH 28 9 pg      MCHC 32 7 g/dL      RDW 12 3 %      MPV 10 9 fL      Platelets 552 Thousands/uL      nRBC 0 /100 WBCs      Neutrophils Relative 59 %      Immat GRANS % 0 %      Lymphocytes Relative 32 %      Monocytes Relative 7 %      Eosinophils Relative 1 %      Basophils Relative 1 %      Neutrophils Absolute 5 81 Thousands/µL      Immature Grans Absolute 0 03 Thousand/uL      Lymphocytes Absolute 3 13 Thousands/µL      Monocytes Absolute 0 63 Thousand/µL      Eosinophils Absolute 0 08 Thousand/µL      Basophils Absolute 0 05 Thousands/µL                  X-ray chest 1 view portable   Final Result by Leonel Johnston MD (07/30 1633)      No acute cardiopulmonary disease  Workstation performed: [id number]                    Procedures  Procedures       Phone Contacts  ED Phone Contact    ED Course  ED Course as of Jul 30 2321 Mon Jul 30, 2018   1722 Procedure Note: EKG  Date/Time: 07/30/18 5:22 PM   Performed by: Lili Guzman   Authorized by: Lili Guzman   Indications / Diagnosis: CP  ECG reviewed by me, the ED Provider: yes   The EKG demonstrates:  Rhythm: normal sinus  Intervals: normal intervals  Axis: normal axis  QRS/Blocks: normal QRS  ST Changes:No acute ST Changes, no STD/NOAH                                   MDM  Number of Diagnoses or Management Options  Atypical chest pain:   Diagnosis management comments: 53-year-old female that presents with chest pain  Patient was seen here had extensive workup including a dissection study which was negative  Will repeat a cardiac workup     Patient had no aneurysm last CT study  Patient already has an appointment to follow up tomorrow      Normal findings on lab work will discharge to follow up with Cardiology advised to return if any worsening of symptoms       Amount and/or Complexity of Data Reviewed  Clinical lab tests: ordered and reviewed  Tests in the radiology section of CPT®: ordered and reviewed  Tests in the medicine section of CPT®: ordered and reviewed      CritCare Time    Disposition  Final diagnoses:   Atypical chest pain     Time reflects when diagnosis was documented in both MDM as applicable and the Disposition within this note     Time User Action Codes Description Comment    7/30/2018  5:38 PM Jose Irene Add [R07 [id number]] Atypical chest pain       ED Disposition     ED Disposition Condition Comment    Discharge  Cameron Seton discharge to home/self care  Condition at discharge: Good        Follow-up Information     Follow up With Specialties Details Why [id number] Amelia Schroeder MD Internal Medicine Schedule an appointment as soon as possible for a visit you have an appointment with Cardiology tomorrow, followup with them  if any worsening symptosm please return  [address]            Discharge Medication List as of 7/30/2018  5:39 PM      CONTINUE these medications which have NOT CHANGED    Details   naproxen (NAPROSYN) 500 mg tablet Take 1 tablet (500 mg total) by mouth 2 (two) times a day with meals, Starting Sun 7/22/2018, Print           No discharge procedures on file      ED Provider  Electronically Signed by           Shanti Marie MD  07/30/18 9016

## 2018-07-31 ENCOUNTER — OFFICE VISIT (OUTPATIENT)
Dept: CARDIOLOGY CLINIC | Facility: CLINIC | Age: 53
End: 2018-07-31
Payer: COMMERCIAL

## 2018-07-31 VITALS
DIASTOLIC BLOOD PRESSURE: 56 MMHG | HEART RATE: 87 BPM | HEIGHT: 62 IN | SYSTOLIC BLOOD PRESSURE: 110 MMHG | WEIGHT: 193.1 LBS | OXYGEN SATURATION: 95 % | BODY MASS INDEX: 35.53 KG/M2

## 2018-07-31 DIAGNOSIS — R09.1 PLEURISY: ICD-10-CM

## 2018-07-31 DIAGNOSIS — F41.1 GENERALIZED ANXIETY DISORDER: ICD-10-CM

## 2018-07-31 DIAGNOSIS — R07.89 ATYPICAL CHEST PAIN: Primary | ICD-10-CM

## 2018-07-31 PROCEDURE — 99244 OFF/OP CNSLTJ NEW/EST MOD 40: CPT | Performed by: INTERNAL MEDICINE

## 2018-07-31 RX ORDER — NAPROXEN 500 MG/1
500 TABLET ORAL 2 TIMES DAILY WITH MEALS
Qty: 30 TABLET | Refills: 5 | Status: SHIPPED | OUTPATIENT
Start: 2018-07-31 | End: 2019-07-18 | Stop reason: ALTCHOICE

## 2018-07-31 RX ORDER — ASPIRIN 325 MG
325 TABLET ORAL DAILY
Qty: 100 TABLET | Refills: 0
Start: 2018-07-31 | End: 2019-07-18 | Stop reason: ALTCHOICE

## 2018-07-31 NOTE — PATIENT INSTRUCTIONS
1   Exercise stress test and echo Doppler study to be done in the next 2 weeks or so  2   If the studies are normal, suggest follow-up with Dr Salinas Maki for suspected fibromyalgia treatment  3   Weight loss was counseled along with discontinuation of electronic cigarettes

## 2018-07-31 NOTE — LETTER
July 31, 2018     Kirill Anderson MD  [address] [facility] [address]    Patient: Lori Lepe   YOB: 1965   Date of Visit: 7/31/2018       Dear Dr Sami Rod: Thank you for referring Lori Lepe to me for evaluation  Below are my notes for this consultation  If you have questions, please do not hesitate to call me  I look forward to following your patient along with you  Sincerely,        Candace Agrawal MD        CC: No Recipients  Candace Agrawal MD  7/31/2018  4:29 PM  Sign at close encounter  Consultation - Cardiology   Lori Lepe 48 y o  female MRN: [id number]  Unit/Bed#:  Encounter: [id number]        Assessment/Plan     Assessment:    1  Apparent noncardiac chest pain, which appears to be reproducible, and likely represents either fibromyalgia or costochondritis with underlying CAD to be excluded  2   Class 2 obesity with patient estimated weight gain of 30-40 lbs in 3 years  3   History of mild dyslipidemia by patient report  4   History of chronic anxiety  5  Family history of sudden cardiac death involving her mother at age 64   10   Patient reported history of restless legs syndrome, currently not treated  7   Former heavy cigarette smoker until 3 years ago, when she switched to electronic cigarettes  Plan:    1  Exercise stress test and echo Doppler study to be done in the next 2 weeks or so  2   If the studies are normal, suggest follow-up with Dr Sami Rod for suspected fibromyalgia treatment  3   Weight loss was counseled along with discontinuation of electronic cigarettes  4   Empirical therapy with naproxen 500 milligrams b i d  with meals for 2 weeks to see if that helps with chest wall discomfort  History of Present Illness      Physician Requesting Consult:   Dr Sami Rod    Reason for Consult / Principal Problem:  Frequent chest pain    HPI: Lori Lepe is a 48y o  year old female who presents with daily chest pain since 07/22/2018  Episodes are described as grade 3/10 dull or heavy discomforts in the left parasternal region with radiation straight through to the left scapula  These episodes are unprovoked and last for 5-10 seconds and are sometimes associated with nausea, a sense of fright and anxiety  About 1 week ago she was asked by her primary care physician to start on aspirin 325 milligrams daily  There is no history of exertional chest pain, dyspnea, orthopnea, paroxysmal nocturnal dyspnea, edema, palpitations, syncope, dizziness, edema, cough, wheezing, sputum production, fever, chills  This patient has a history of emergency department visit on 2018 and again on 2018 for the same chest discomfort  On both occasions, her emergency department workup was negative  She claims a history of mild dyslipidemia, chronic anxiety, restless legs syndrome, prior lower extremity pains, and a family history of sudden cardiac death involving her mother at age 64  Her maternal grandmother also  of heart failure at age 80  The patient estimates she has gained between 30 and 40 lbs over the past 3 years after quitting smoking  She was a former 1-3 packs per day smoker for 32 years, having quit 3 years ago  She still uses electronic cigarettes, however  She was recommended to uses statin medication last year but apparently developed lower extremity pains and stoppage shortly after starting it  There was also some consideration to starting her on medication for restless legs, but she refused to continue that  Historical Information   Past Medical History:   Diagnosis Date    Anxiety     Psychiatric disorder     anxiety     Past Surgical History:   Procedure Laterality Date     SECTION      HYSTERECTOMY      TONSILLECTOMY       History   Alcohol Use No    She rarely uses beer or wine on social occasions    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, working about 30 hours per week, generally 7 hours at a time, with 1 client at a time  She is twice  and has a a boyfriend, at 55-year-old son, 3 children from her 1st marriage and 2 grandchildren ages 1 in 3, who live in [place]  History   Drug Use No     History   Smoking Status    Light Tobacco Smoker    Types: E-Cigarettes   Smokeless Tobacco    Never Used     Comment: vape     Family History   Problem Relation Age of Onset    Heart disease Mother     Heart attack Mother     Diabetes Mother     Diabetes Father     Heart disease Father     Atrial fibrillation Father     Snyder's esophagus Sister     Diabetes Maternal Grandmother     Diabetes Maternal Grandfather        Meds/Allergies   Prior to Admission medications    Medication Sig Start Date End Date Taking? Authorizing Provider   naproxen (NAPROSYN) 500 mg tablet Take 1 tablet (500 mg total) by mouth 2 (two) times a day with meals 0/16/75  Yes Jad Anderson MD     Current Outpatient Prescriptions   Medication Sig Dispense Refill    naproxen (NAPROSYN) 500 mg tablet Take 1 tablet (500 mg total) by mouth 2 (two) times a day with meals 10 tablet 0     No current facility-administered medications for this visit  No Known Allergies    Cardiovascular ROS:       More than 10 systems reviewed and all systems negative, except as noted in history of present illness    Objective   Vitals: Blood pressure 110/56, pulse 87, height 5' 2" (1 575 m), weight 87 6 kg (193 lb 1 6 oz), SpO2 95 %  Body mass index is 35 32 kg/m²  Physical Exam  General -well-developed moderately obese  female  in no acute distress  Patient is alert, oriented X3 and cooperative  Skin-warm and dry with no pallor, rashes, ecchymoses, lesions  HEENT-normocephalic  Pupils equally round and reactive to light and accommodation  Extraocular muscles intact  Mucous membranes pink and moist  Sclerae nonicteric  Optic fundi poorly seen    Neck-no jugular venous distention, AJR, masses, thyromegaly, adenopathy, bruits  Lungs-no rales, rhonchi, wheezes  Heart-no murmur, gallop, rub, click, heave, thrill  Chest-there is tenderness to palpation at the level of the left and right parasternal area near the mid sternum, which reproduces some of the chest discomfort she has been having  Abdomen-normal bowel sounds with no masses, organomegaly, guarding, tenderness, or rigidity  Moderate obesity noted  Extremities-no cyanosis, clubbing, edema, pulse decrease  Neurological-no focal neurological signs  Imaging:     EKG 07/30/2018:     Normal EKG, unchanged from 07/22/2018 and 10/31/2017    Imaging  Chest X-Ray 07/30/2018:  No acute cardiopulmonary disease    CTA dissection protocol of chest and abdomen 07/22/2018:    No acute findings in chest, abdomen, pelvis  4 millimeter left lower lobe pulmonary nodule, unchanged from 10/23/2009       Lab Review     Lab Results   Component Value Date     07/30/2018    K 3 6 07/30/2018     07/30/2018    CO2 30 07/30/2018    ANIONGAP 8 07/30/2018    BUN 14 07/30/2018    CREATININE 0 62 07/30/2018    GLUCOSE 94 07/30/2018    CALCIUM 9 2 07/30/2018    AST 26 07/30/2018    ALT 36 07/30/2018    ALKPHOS 50 07/30/2018    PROT 7 9 07/30/2018    BILITOT 0 20 07/30/2018    EGFR 105 07/30/2018       No results found for: CHOL  No results found for: HDL  No results found for: LDLCALC  No results found for: TRIG  No components found for: CHOLHDL    Lab Results   Component Value Date    GLUCOSE 94 07/30/2018    CALCIUM 9 2 07/30/2018     07/30/2018    K 3 6 07/30/2018    CO2 30 07/30/2018     07/30/2018    BUN 14 07/30/2018    CREATININE 0 62 07/30/2018       Counseling / Coordination of Care  Total office time spent today 62 minutes       Shara Costello MD

## 2018-07-31 NOTE — PROGRESS NOTES
Consultation - Cardiology   Diana Julien 48 y o  female MRN: [id number]  Unit/Bed#:  Encounter: [id number]        Assessment/Plan     Assessment:    1  Apparent noncardiac chest pain, which appears to be reproducible, and likely represents either fibromyalgia or costochondritis with underlying CAD to be excluded  2   Class 2 obesity with patient estimated weight gain of 30-40 lbs in 3 years  3   History of mild dyslipidemia by patient report  4   History of chronic anxiety  5  Family history of sudden cardiac death involving her mother at age 64   10   Patient reported history of restless legs syndrome, currently not treated  7   Former heavy cigarette smoker until 3 years ago, when she switched to electronic cigarettes  Plan:    1  Exercise stress test and echo Doppler study to be done in the next 2 weeks or so  2   If the studies are normal, suggest follow-up with Dr Srinath Sinha for suspected fibromyalgia treatment  3   Weight loss was counseled along with discontinuation of electronic cigarettes  4   Empirical therapy with naproxen 500 milligrams b i d  with meals for 2 weeks to see if that helps with chest wall discomfort  History of Present Illness      Physician Requesting Consult:   Dr Srinath Sinha    Reason for Consult / Principal Problem:  Frequent chest pain    HPI: Diana Julien is a 48y o  year old female who presents with daily chest pain since 07/22/2018  Episodes are described as grade 3/10 dull or heavy discomforts in the left parasternal region with radiation straight through to the left scapula  These episodes are unprovoked and last for 5-10 seconds and are sometimes associated with nausea, a sense of fright and anxiety  About 1 week ago she was asked by her primary care physician to start on aspirin 325 milligrams daily    There is no history of exertional chest pain, dyspnea, orthopnea, paroxysmal nocturnal dyspnea, edema, palpitations, syncope, dizziness, edema, cough, wheezing, sputum production, fever, chills  This patient has a history of emergency department visit on 2018 and again on 2018 for the same chest discomfort  On both occasions, her emergency department workup was negative  She claims a history of mild dyslipidemia, chronic anxiety, restless legs syndrome, prior lower extremity pains, and a family history of sudden cardiac death involving her mother at age 64  Her maternal grandmother also  of heart failure at age 80  The patient estimates she has gained between 30 and 40 lbs over the past 3 years after quitting smoking  She was a former 1-3 packs per day smoker for 32 years, having quit 3 years ago  She still uses electronic cigarettes, however  She was recommended to uses statin medication last year but apparently developed lower extremity pains and stoppage shortly after starting it  There was also some consideration to starting her on medication for restless legs, but she refused to continue that  Historical Information   Past Medical History:   Diagnosis Date    Anxiety     Psychiatric disorder     anxiety     Past Surgical History:   Procedure Laterality Date     SECTION      HYSTERECTOMY      TONSILLECTOMY       History   Alcohol Use No    She rarely uses beer or wine on social occasions  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, working about 30 hours per week, generally 7 hours at a time, with 1 client at a time  She is twice  and has a a boyfriend, at 28-year-old son, 3 children from her 1st marriage and 2 grandchildren ages 1 in 3, who live in [place]      History   Drug Use No     History   Smoking Status    Light Tobacco Smoker    Types: E-Cigarettes   Smokeless Tobacco    Never Used     Comment: vape     Family History   Problem Relation Age of Onset    Heart disease Mother     Heart attack Mother     Diabetes Mother     Diabetes Father     Heart disease Father     Atrial fibrillation Father     Snyder's esophagus Sister     Diabetes Maternal Grandmother     Diabetes Maternal Grandfather        Meds/Allergies   Prior to Admission medications    Medication Sig Start Date End Date Taking? Authorizing Provider   naproxen (NAPROSYN) 500 mg tablet Take 1 tablet (500 mg total) by mouth 2 (two) times a day with meals 8/[birth year]/51  Yes Nelsy Ohara MD     Current Outpatient Prescriptions   Medication Sig Dispense Refill    naproxen (NAPROSYN) 500 mg tablet Take 1 tablet (500 mg total) by mouth 2 (two) times a day with meals 10 tablet 0     No current facility-administered medications for this visit  No Known Allergies    Cardiovascular ROS:       More than 10 systems reviewed and all systems negative, except as noted in history of present illness    Objective   Vitals: Blood pressure 110/56, pulse 87, height 5' 2" (1 575 m), weight 87 6 kg (193 lb 1 6 oz), SpO2 95 %  Body mass index is 35 32 kg/m²  Physical Exam  General -well-developed moderately obese  female  in no acute distress  Patient is alert, oriented X3 and cooperative  Skin-warm and dry with no pallor, rashes, ecchymoses, lesions  HEENT-normocephalic  Pupils equally round and reactive to light and accommodation  Extraocular muscles intact  Mucous membranes pink and moist  Sclerae nonicteric  Optic fundi poorly seen  Neck-no jugular venous distention, AJR, masses, thyromegaly, adenopathy, bruits  Lungs-no rales, rhonchi, wheezes  Heart-no murmur, gallop, rub, click, heave, thrill  Chest-there is tenderness to palpation at the level of the left and right parasternal area near the mid sternum, which reproduces some of the chest discomfort she has been having  Abdomen-normal bowel sounds with no masses, organomegaly, guarding, tenderness, or rigidity  Moderate obesity noted  Extremities-no cyanosis, clubbing, edema, pulse decrease  Neurological-no focal neurological signs  Imaging:     EKG 07/30/2018:     Normal EKG, unchanged from 07/22/2018 and 10/31/2017    Imaging  Chest X-Ray 07/30/2018:  No acute cardiopulmonary disease    CTA dissection protocol of chest and abdomen 07/22/2018:    No acute findings in chest, abdomen, pelvis  4 millimeter left lower lobe pulmonary nodule, unchanged from 10/23/2009       Lab Review     Lab Results   Component Value Date     07/30/2018    K 3 6 07/30/2018     07/30/2018    CO2 30 07/30/2018    ANIONGAP 8 07/30/2018    BUN 14 07/30/2018    CREATININE 0 62 07/30/2018    GLUCOSE 94 07/30/2018    CALCIUM 9 2 07/30/2018    AST 26 07/30/2018    ALT 36 07/30/2018    ALKPHOS 50 07/30/2018    PROT 7 9 07/30/2018    BILITOT 0 20 07/30/2018    EGFR 105 07/30/2018       No results found for: CHOL  No results found for: HDL  No results found for: LDLCALC  No results found for: TRIG  No components found for: CHOLHDL    Lab Results   Component Value Date    GLUCOSE 94 07/30/2018    CALCIUM 9 2 07/30/2018     07/30/2018    K 3 6 07/30/2018    CO2 30 07/30/2018     07/30/2018    BUN 14 07/30/2018    CREATININE 0 62 07/30/2018       Counseling / Coordination of Care  Total office time spent today 62 minutes       Le Resendiz MD

## 2018-08-01 LAB
ATRIAL RATE: 68 BPM
P AXIS: 76 DEGREES
PR INTERVAL: 130 MS
QRS AXIS: 50 DEGREES
QRSD INTERVAL: 80 MS
QT INTERVAL: 392 MS
QTC INTERVAL: 416 MS
T WAVE AXIS: 55 DEGREES
VENTRICULAR RATE: 68 BPM

## 2018-08-01 PROCEDURE — 93010 ELECTROCARDIOGRAM REPORT: CPT | Performed by: INTERNAL MEDICINE

## 2018-08-17 ENCOUNTER — TELEPHONE (OUTPATIENT)
Dept: CARDIOLOGY CLINIC | Facility: CLINIC | Age: 53
End: 2018-08-17

## 2018-08-17 NOTE — TELEPHONE ENCOUNTER
Saroj Linares (stress lab)called to check if the [address] was done for her stress and echo  I saw that it was pending  I told her that I would reach out to the [address] [facility] to check  I did check with jailyn and she stated that she was going to have a regular stress test which does not need auth  When I went to check on the status of the test, I saw that both the echo and stress test were canceled by levy and the patient was notified   Adina Boyce 8/17/18

## 2018-08-20 ENCOUNTER — TELEPHONE (OUTPATIENT)
Dept: CARDIOLOGY CLINIC | Facility: CLINIC | Age: 53
End: 2018-08-20

## 2018-08-22 ENCOUNTER — TELEPHONE (OUTPATIENT)
Dept: CARDIOLOGY CLINIC | Facility: CLINIC | Age: 53
End: 2018-08-22

## 2018-09-04 ENCOUNTER — HOSPITAL ENCOUNTER (OUTPATIENT)
Dept: NON INVASIVE DIAGNOSTICS | Facility: HOSPITAL | Age: 53
Discharge: HOME | End: 2018-09-04
Attending: INTERNAL MEDICINE
Payer: COMMERCIAL

## 2018-09-04 DIAGNOSIS — R07.89 ATYPICAL CHEST PAIN: ICD-10-CM

## 2018-09-04 LAB
CHEST PAIN STATEMENT: NORMAL
MAX DIASTOLIC BP: 74 MMHG
MAX HEART RATE: 166 BPM
MAX PREDICTED HEART RATE: 169 BPM
MAX. SYSTOLIC BP: 178 MMHG
PROTOCOL NAME: NORMAL
REASON FOR TERMINATION: NORMAL
TARGET HR FORMULA: NORMAL
TEST INDICATION: NORMAL
TIME IN EXERCISE PHASE: NORMAL

## 2018-09-04 PROCEDURE — 93017 CV STRESS TEST TRACING ONLY: CPT

## 2018-09-04 PROCEDURE — 93306 TTE W/DOPPLER COMPLETE: CPT

## 2018-09-04 PROCEDURE — 93306 TTE W/DOPPLER COMPLETE: CPT | Performed by: INTERNAL MEDICINE

## 2018-09-05 ENCOUNTER — TELEPHONE (OUTPATIENT)
Dept: CARDIOLOGY CLINIC | Facility: CLINIC | Age: 53
End: 2018-09-05

## 2018-09-05 DIAGNOSIS — R07.89 ATYPICAL CHEST PAIN: Primary | ICD-10-CM

## 2018-09-05 DIAGNOSIS — R94.31 ABNORMAL ECG DURING EXERCISE STRESS TEST: ICD-10-CM

## 2018-09-05 PROCEDURE — 93018 CV STRESS TEST I&R ONLY: CPT | Performed by: INTERNAL MEDICINE

## 2018-09-05 PROCEDURE — 93016 CV STRESS TEST SUPVJ ONLY: CPT | Performed by: INTERNAL MEDICINE

## 2018-09-05 NOTE — TELEPHONE ENCOUNTER
Spoke to patient, made aware of results as well as the order for a nuclear  Can we please schedule her for a nuclear? Thanks

## 2018-09-05 NOTE — TELEPHONE ENCOUNTER
----- Message from Alexx Hernandez MD sent at 9/5/2018  1:18 PM EDT -----  Echocardiogram was normal  Stress test EKG was mildly abnormal at peak exercise, and an imaging stress test was recommended  I have ordered a nuclear exercise stress test, which will need to be approved, in view of the abnormal regular exercise stress test   Please go work on the approval and let the patient know and help her with scheduling

## 2018-09-06 ENCOUNTER — TELEPHONE (OUTPATIENT)
Dept: CARDIOLOGY CLINIC | Facility: CLINIC | Age: 53
End: 2018-09-06

## 2018-09-06 NOTE — TELEPHONE ENCOUNTER
Spoke with patient and reassured her regarding the significance of the abnormalities on the stress test   We will await an imaging stress test and reach out to her with the results and further suggestions  She was quite comfortable with this

## 2018-09-18 ENCOUNTER — TELEPHONE (OUTPATIENT)
Dept: CARDIOLOGY CLINIC | Facility: CLINIC | Age: 53
End: 2018-09-18

## 2018-09-19 ENCOUNTER — HOSPITAL ENCOUNTER (OUTPATIENT)
Dept: NON INVASIVE DIAGNOSTICS | Facility: HOSPITAL | Age: 53
Discharge: HOME | End: 2018-09-19
Attending: INTERNAL MEDICINE

## 2018-09-19 ENCOUNTER — TELEPHONE (OUTPATIENT)
Dept: CARDIOLOGY CLINIC | Facility: CLINIC | Age: 53
End: 2018-09-19

## 2018-09-28 ENCOUNTER — HOSPITAL ENCOUNTER (OUTPATIENT)
Dept: RADIOLOGY | Facility: HOSPITAL | Age: 53
Discharge: HOME | End: 2018-09-28
Attending: INTERNAL MEDICINE
Payer: COMMERCIAL

## 2018-09-28 ENCOUNTER — TRANSCRIBE ORDERS (OUTPATIENT)
Dept: ADMINISTRATIVE | Facility: HOSPITAL | Age: 53
End: 2018-09-28

## 2018-09-28 ENCOUNTER — HOSPITAL ENCOUNTER (OUTPATIENT)
Dept: NON INVASIVE DIAGNOSTICS | Facility: HOSPITAL | Age: 53
Discharge: HOME | End: 2018-09-28
Attending: INTERNAL MEDICINE
Payer: COMMERCIAL

## 2018-09-28 DIAGNOSIS — R94.31 ABNORMAL ECG DURING EXERCISE STRESS TEST: ICD-10-CM

## 2018-09-28 DIAGNOSIS — R07.89 ATYPICAL CHEST PAIN: ICD-10-CM

## 2018-09-28 LAB
CHEST PAIN STATEMENT: NORMAL
MAX DIASTOLIC BP: 70 MMHG
MAX HEART RATE: 160 BPM
MAX PREDICTED HEART RATE: 169 BPM
MAX. SYSTOLIC BP: 174 MMHG
PROTOCOL NAME: NORMAL
TARGET HR FORMULA: NORMAL
TEST INDICATION: NORMAL
TIME IN EXERCISE PHASE: NORMAL

## 2018-09-28 PROCEDURE — A9502 TC99M TETROFOSMIN: HCPCS

## 2018-09-28 PROCEDURE — 78452 HT MUSCLE IMAGE SPECT MULT: CPT

## 2018-09-28 PROCEDURE — 93018 CV STRESS TEST I&R ONLY: CPT | Performed by: INTERNAL MEDICINE

## 2018-09-28 PROCEDURE — 78452 HT MUSCLE IMAGE SPECT MULT: CPT | Performed by: INTERNAL MEDICINE

## 2018-09-28 PROCEDURE — 93017 CV STRESS TEST TRACING ONLY: CPT

## 2018-09-28 PROCEDURE — 93016 CV STRESS TEST SUPVJ ONLY: CPT | Performed by: INTERNAL MEDICINE

## 2018-10-01 ENCOUNTER — TELEPHONE (OUTPATIENT)
Dept: CARDIOLOGY CLINIC | Facility: CLINIC | Age: 53
End: 2018-10-01

## 2018-10-02 ENCOUNTER — TELEPHONE (OUTPATIENT)
Dept: CARDIOLOGY CLINIC | Facility: CLINIC | Age: 53
End: 2018-10-02

## 2018-10-02 NOTE — TELEPHONE ENCOUNTER
----- Message from Keli Angulo MD sent at 10/1/2018  4:23 PM EDT -----  Call patient and tell her that nuclear stress test on 09/28/2018 was normal    Her echocardiogram was also normal   There is no need for further cardiology testing or follow-up    She should follow up with Dr Nan Silva for continuing discomfort in her chest

## 2019-05-10 ENCOUNTER — OFFICE VISIT (OUTPATIENT)
Dept: PODIATRY | Facility: CLINIC | Age: 54
End: 2019-05-10
Payer: COMMERCIAL

## 2019-05-10 VITALS
WEIGHT: 193 LBS | SYSTOLIC BLOOD PRESSURE: 128 MMHG | DIASTOLIC BLOOD PRESSURE: 81 MMHG | HEART RATE: 63 BPM | HEIGHT: 62 IN | RESPIRATION RATE: 16 BRPM | BODY MASS INDEX: 35.51 KG/M2

## 2019-05-10 DIAGNOSIS — Q66.52 CONGENITAL PES PLANUS OF LEFT FOOT: ICD-10-CM

## 2019-05-10 DIAGNOSIS — M79.671 PAIN IN BOTH FEET: ICD-10-CM

## 2019-05-10 DIAGNOSIS — B35.3 TINEA PEDIS OF BOTH FEET: ICD-10-CM

## 2019-05-10 DIAGNOSIS — M72.2 PLANTAR FASCIITIS: Primary | ICD-10-CM

## 2019-05-10 DIAGNOSIS — Q66.51 CONGENITAL PES PLANUS OF RIGHT FOOT: ICD-10-CM

## 2019-05-10 DIAGNOSIS — M79.672 PAIN IN BOTH FEET: ICD-10-CM

## 2019-05-10 PROCEDURE — L3000 FT INSERT UCB BERKELEY SHELL: HCPCS | Performed by: PODIATRIST

## 2019-05-10 PROCEDURE — 99243 OFF/OP CNSLTJ NEW/EST LOW 30: CPT | Performed by: PODIATRIST

## 2019-05-10 RX ORDER — ESCITALOPRAM OXALATE 10 MG/1
TABLET ORAL
Refills: 2 | COMMUNITY
Start: 2019-04-10

## 2019-05-10 RX ORDER — KETOCONAZOLE 20 MG/G
CREAM TOPICAL DAILY
Qty: 60 G | Refills: 1 | Status: SHIPPED | OUTPATIENT
Start: 2019-05-10 | End: 2019-07-18 | Stop reason: ALTCHOICE

## 2019-05-10 RX ORDER — MELOXICAM 7.5 MG/1
7.5 TABLET ORAL DAILY
Qty: 30 TABLET | Refills: 0 | Status: SHIPPED | OUTPATIENT
Start: 2019-05-10 | End: 2019-07-18 | Stop reason: SDUPTHER

## 2019-07-18 ENCOUNTER — OFFICE VISIT (OUTPATIENT)
Dept: PODIATRY | Facility: CLINIC | Age: 54
End: 2019-07-18
Payer: COMMERCIAL

## 2019-07-18 VITALS — DIASTOLIC BLOOD PRESSURE: 81 MMHG | RESPIRATION RATE: 16 BRPM | HEART RATE: 67 BPM | SYSTOLIC BLOOD PRESSURE: 132 MMHG

## 2019-07-18 DIAGNOSIS — Q66.51 CONGENITAL PES PLANUS OF RIGHT FOOT: ICD-10-CM

## 2019-07-18 DIAGNOSIS — Q66.52 CONGENITAL PES PLANUS OF LEFT FOOT: ICD-10-CM

## 2019-07-18 DIAGNOSIS — M79.671 PAIN IN BOTH FEET: ICD-10-CM

## 2019-07-18 DIAGNOSIS — M72.2 PLANTAR FASCIITIS: Primary | ICD-10-CM

## 2019-07-18 DIAGNOSIS — B35.3 TINEA PEDIS OF BOTH FEET: ICD-10-CM

## 2019-07-18 DIAGNOSIS — M79.672 PAIN IN BOTH FEET: ICD-10-CM

## 2019-07-18 PROCEDURE — 99213 OFFICE O/P EST LOW 20 MIN: CPT | Performed by: PODIATRIST

## 2019-07-18 RX ORDER — MELOXICAM 7.5 MG/1
7.5 TABLET ORAL DAILY
Qty: 30 TABLET | Refills: 0 | Status: SHIPPED | OUTPATIENT
Start: 2019-07-18 | End: 2019-09-25

## 2019-07-18 NOTE — PROGRESS NOTES
Assessment/Plan:  Plantar fasciitis  Pain upon ambulation  Pes planus bilateral      Plan  Foot exam performed  Right and left heel trigger point injection done  1 25 cc of Kenalog 10 injected into righ an left t heel without pain or complication  We will add Mobic  Patient declines physical therapy           There are no diagnoses linked to this encounter        Subjective:  Patient has pain in her right and left heel  Patient suffers with post static dyskinesia  No history of trauma  This has been ongoing for months           Past Medical History:   Diagnosis Date    Anxiety      Psychiatric disorder       anxiety               Past Surgical History:   Procedure Laterality Date     SECTION        HYSTERECTOMY        TONSILLECTOMY             No Known Allergies        Current Outpatient Medications:     aspirin 325 mg tablet, Take 1 tablet (325 mg total) by mouth daily, Disp: 100 tablet, Rfl: 0    naproxen (NAPROSYN) 500 mg tablet, Take 1 tablet (500 mg total) by mouth 2 (two) times a day with meals, Disp: 30 tablet, Rfl: 5         Patient Active Problem List   Diagnosis    Atypical chest pain             Patient ID: Delbert Tate is a 48 y o  female      HPI     The following portions of the patient's history were reviewed and updated as appropriate: She  has a past medical history of Anxiety and Psychiatric disorder  She        Patient Active Problem List     Diagnosis Date Noted    Atypical chest pain 2018      She  has a past surgical history that includes Hysterectomy;  section; and Tonsillectomy  Her family history includes Atrial fibrillation in her father; Snyder's esophagus in her sister; Diabetes in her father, maternal grandfather, maternal grandmother, and mother; Heart attack in her mother; Heart disease in her father and mother  She  reports that she has been smoking e-cigarettes   She has never used smokeless tobacco  She reports that she does not drink alcohol or use drugs  Current Outpatient Medications   Medication Sig Dispense Refill    aspirin 325 mg tablet Take 1 tablet (325 mg total) by mouth daily 100 tablet 0    naproxen (NAPROSYN) 500 mg tablet Take 1 tablet (500 mg total) by mouth 2 (two) times a day with meals 30 tablet 5      No current facility-administered medications for this visit             Current Outpatient Medications on File Prior to Visit   Medication Sig    aspirin 325 mg tablet Take 1 tablet (325 mg total) by mouth daily    naproxen (NAPROSYN) 500 mg tablet Take 1 tablet (500 mg total) by mouth 2 (two) times a day with meals      No current facility-administered medications on file prior to visit        She has No Known Allergies        There were no vitals filed for this visit           Objective:  Patient's shoes and socks removed     Foot Exam     General  General Appearance: appears stated age and healthy   Orientation: alert and oriented to person, place, and time   Affect: appropriate   Gait: antalgic         Right Foot/Ankle      Inspection and Palpation  Ecchymosis: none  Tenderness: calcaneus tenderness, bony tenderness and plantar fascia   Swelling: dorsum and metatarsals   Arch: pes planus  Hammertoes: fifth toe  Hallux valgus: no  Hallux limitus: yes  Skin Exam: dry skin and tinea;      Neurovascular  Dorsalis pedis: 3+  Posterior tibial: 3+  Saphenous nerve sensation: normal  Tibial nerve sensation: normal  Superficial peroneal nerve sensation: normal  Deep peroneal nerve sensation: normal  Sural nerve sensation: normal        Left Foot/Ankle       Inspection and Palpation  Ecchymosis: none  Tenderness: none   Swelling: dorsum and metatarsals   Arch: pes planus  Hammertoes: fifth toe  Hallux valgus: no  Hallux limitus: yes  Skin Exam: dry skin and tinea;      Neurovascular  Dorsalis pedis: 3+  Posterior tibial: 3+  Saphenous nerve sensation: normal  Tibial nerve sensation: normal  Superficial peroneal nerve sensation: normal  Deep peroneal nerve sensation: normal  Sural nerve sensation: normal           Physical Exam   Constitutional: She is oriented to person, place, and time  She appears well-developed and well-nourished  Cardiovascular: Normal rate and regular rhythm  Pulses:       Dorsalis pedis pulses are 3+ on the right side, and 3+ on the left side  Posterior tibial pulses are 3+ on the right side, and 3+ on the left side  Musculoskeletal:        Right foot: There is bony tenderness  Patient is maximally pronated in stance and gait  There is pain with palpation right plantar fascia insertion  Feet:   Right Foot:   Skin Integrity: Positive for dry skin  Left Foot:   Skin Integrity: Positive for dry skin  Neurological: She is alert and oriented to person, place, and time  Skin: Skin is warm and dry  Capillary refill takes less than 2 seconds  [place] tinea pedis noted bilateral   Psychiatric: She has a normal mood and affect   Her behavior is normal  Judgment and thought content normal    Vitals reviewed

## 2019-10-10 ENCOUNTER — OFFICE VISIT (OUTPATIENT)
Dept: PODIATRY | Facility: CLINIC | Age: 54
End: 2019-10-10
Payer: COMMERCIAL

## 2019-10-10 VITALS
DIASTOLIC BLOOD PRESSURE: 90 MMHG | HEIGHT: 62 IN | BODY MASS INDEX: 35.51 KG/M2 | SYSTOLIC BLOOD PRESSURE: 128 MMHG | WEIGHT: 193 LBS

## 2019-10-10 DIAGNOSIS — Q66.51 CONGENITAL PES PLANUS OF RIGHT FOOT: ICD-10-CM

## 2019-10-10 DIAGNOSIS — M72.2 PLANTAR FASCIITIS: Primary | ICD-10-CM

## 2019-10-10 DIAGNOSIS — B35.1 ONYCHOMYCOSIS: ICD-10-CM

## 2019-10-10 DIAGNOSIS — Q66.52 CONGENITAL PES PLANUS OF LEFT FOOT: ICD-10-CM

## 2019-10-10 DIAGNOSIS — M79.672 PAIN IN BOTH FEET: ICD-10-CM

## 2019-10-10 DIAGNOSIS — M79.671 PAIN IN BOTH FEET: ICD-10-CM

## 2019-10-10 PROCEDURE — 99214 OFFICE O/P EST MOD 30 MIN: CPT | Performed by: PODIATRIST

## 2019-10-10 RX ORDER — MELOXICAM 7.5 MG/1
7.5 TABLET ORAL DAILY
Qty: 30 TABLET | Refills: 0 | Status: SHIPPED | OUTPATIENT
Start: 2019-10-10 | End: 2019-11-09

## 2019-10-10 RX ORDER — TERBINAFINE HYDROCHLORIDE 250 MG/1
250 TABLET ORAL DAILY
Qty: 30 TABLET | Refills: 0 | Status: SHIPPED | OUTPATIENT
Start: 2019-10-10 | End: 2019-11-09

## 2019-10-10 NOTE — PROGRESS NOTES
Assessment/Plan:  Plantar fasciitis   Pain upon ambulation   Pes planus bilateral      Plan   Foot exam performed   Right and left heel trigger point injection done   1 25 cc of Kenalog 10 injected into right and left t heel without pain or complication   We will add Mobic   Patient declines physical therapy  Patient educated on nail care  She will be started on terbinafine  In addition we will add Mobic  Patient is being prescribed physical therapy         There are no diagnoses linked to this encounter        Subjective:  Patient has pain in her right and left heel   Patient suffers with post static dyskinesia   No history of trauma   This has been ongoing for months  Patient also has concerned with middle toenail of left foot              Past Medical History:   Diagnosis Date    Anxiety      Psychiatric disorder       anxiety                   Past Surgical History:   Procedure Laterality Date     SECTION        HYSTERECTOMY        TONSILLECTOMY             No Known Allergies        Current Outpatient Medications:     aspirin 325 mg tablet, Take 1 tablet (325 mg total) by mouth daily, Disp: 100 tablet, Rfl: 0    naproxen (NAPROSYN) 500 mg tablet, Take 1 tablet (500 mg total) by mouth 2 (two) times a day with meals, Disp: 30 tablet, Rfl: 5           Patient Active Problem List   Diagnosis    Atypical chest pain             Patient ID: Lenora Sanchez is a 48 y  o  female      HPI     The following portions of the patient's history were reviewed and updated as appropriate: She  has a past medical history of Anxiety and Psychiatric disorder  She           Patient Active Problem List     Diagnosis Date Noted    Atypical chest pain 2018      She  has a past surgical history that includes Hysterectomy;  section; and Tonsillectomy    Her family history includes Atrial fibrillation in her father; Snyder's esophagus in her sister; Diabetes in her father, maternal grandfather, maternal grandmother, and mother; Heart attack in her mother; Heart disease in her father and mother  She  reports that she has been smoking e-cigarettes  She has never used smokeless tobacco  She reports that she does not drink alcohol or use drugs               Current Outpatient Medications   Medication Sig Dispense Refill    aspirin 325 mg tablet Take 1 tablet (325 mg total) by mouth daily 100 tablet 0    naproxen (NAPROSYN) 500 mg tablet Take 1 tablet (500 mg total) by mouth 2 (two) times a day with meals 30 tablet 5      No current facility-administered medications for this visit                Current Outpatient Medications on File Prior to Visit   Medication Sig    aspirin 325 mg tablet Take 1 tablet (325 mg total) by mouth daily    naproxen (NAPROSYN) 500 mg tablet Take 1 tablet (500 mg total) by mouth 2 (two) times a day with meals      No current facility-administered medications on file prior to visit        She has No Known Allergies        There were no vitals filed for this visit            Objective:  Patient's shoes and socks removed    Foot Exam     General  General Appearance: appears stated age and healthy   Orientation: alert and oriented to person, place, and time   Affect: appropriate   Gait: antalgic         Right Foot/Ankle      Inspection and Palpation  Ecchymosis: none  Tenderness: calcaneus tenderness, bony tenderness and plantar fascia   Swelling: dorsum and metatarsals   Arch: pes planus  Hammertoes: fifth toe  Hallux valgus: no  Hallux limitus: yes  Skin Exam: dry skin and tinea;      Neurovascular  Dorsalis pedis: 3+  Posterior tibial: 3+  Saphenous nerve sensation: normal  Tibial nerve sensation: normal  Superficial peroneal nerve sensation: normal  Deep peroneal nerve sensation: normal  Sural nerve sensation: normal        Left Foot/Ankle       Inspection and Palpation  Ecchymosis: none  Tenderness: none   Swelling: dorsum and metatarsals   Arch: pes planus  Hammertoes: fifth toe  Hallux valgus: no  Hallux limitus: yes  Skin Exam: dry skin and tinea;      Neurovascular  Dorsalis pedis: 3+  Posterior tibial: 3+  Saphenous nerve sensation: normal  Tibial nerve sensation: normal  Superficial peroneal nerve sensation: normal  Deep peroneal nerve sensation: normal  Sural nerve sensation: normal           Physical Exam   Constitutional: She is oriented to person, place, and time  She appears well-developed and well-nourished  Cardiovascular: Normal rate and regular rhythm  Pulses:       Dorsalis pedis pulses are 3+ on the right side, and 3+ on the left side         Posterior tibial pulses are 3+ on the right side, and 3+ on the left side  Musculoskeletal:        Right foot: There is bony tenderness    Patient is maximally pronated in stance and gait   There is pain with palpation right plantar fascia insertion    Feet:   Right Foot:   Skin Integrity: Positive for dry skin  Left Foot:   Skin Integrity: Positive for dry skin  Neurological: She is alert and oriented to person, place, and time  Skin: Skin is warm and dry  Capillary refill takes less than 2 seconds    [place] tinea pedis noted bilateral   Psychiatric: She has a normal mood and affect  Her behavior is normal  Judgment and thought content normal      3rd left toenail demonstrates distal mycosis    Vitals reviewed

## 2020-01-21 ENCOUNTER — OFFICE VISIT (OUTPATIENT)
Dept: PODIATRY | Facility: CLINIC | Age: 55
End: 2020-01-21
Payer: COMMERCIAL

## 2020-01-21 VITALS — RESPIRATION RATE: 16 BRPM | BODY MASS INDEX: 35.51 KG/M2 | WEIGHT: 193 LBS | HEIGHT: 62 IN

## 2020-01-21 DIAGNOSIS — M79.671 PAIN IN BOTH FEET: ICD-10-CM

## 2020-01-21 DIAGNOSIS — M76.70 PERONEAL TENDINITIS, UNSPECIFIED LATERALITY: Primary | ICD-10-CM

## 2020-01-21 DIAGNOSIS — M79.672 PAIN IN BOTH FEET: ICD-10-CM

## 2020-01-21 DIAGNOSIS — M79.7 FIBROMYALGIA: ICD-10-CM

## 2020-01-21 PROCEDURE — 99213 OFFICE O/P EST LOW 20 MIN: CPT | Performed by: PODIATRIST

## 2020-01-21 RX ORDER — GABAPENTIN 100 MG/1
100 CAPSULE ORAL 3 TIMES DAILY
Qty: 90 CAPSULE | Refills: 0 | Status: SHIPPED | OUTPATIENT
Start: 2020-01-21 | End: 2020-02-20

## 2020-01-21 NOTE — PROGRESS NOTES
Assessment/Plan:  Pain upon ambulation  Peroneal tendinitis  Osteoarthritis  Pes planus  Rule out fibromyalgia  Plan  Patient will continue use orthotics  In addition patient will do a trial of gabapentin  She declines physical therapy  She declines injection therapy  No problem-specific Assessment & Plan notes found for this encounter  Diagnoses and all orders for this visit:    Peroneal tendinitis, unspecified laterality    Pain in both feet    Fibromyalgia  -     gabapentin (NEURONTIN) 100 mg capsule; Take 1 capsule (100 mg total) by mouth 3 (three) times a day          Subjective:  Patient has a new problem  Patient has pain upon ambulation  Patient has pain on the outside of her feet  This is a new finding  No history of trauma  Patient has low back problems  She also has restless leg syndrome    Past Medical History:   Diagnosis Date    Anxiety     Psychiatric disorder     anxiety       Past Surgical History:   Procedure Laterality Date     SECTION      HYSTERECTOMY      TONSILLECTOMY         No Known Allergies      Current Outpatient Medications:     escitalopram (LEXAPRO) 10 mg tablet, , Disp: , Rfl: 2    gabapentin (NEURONTIN) 100 mg capsule, Take 1 capsule (100 mg total) by mouth 3 (three) times a day, Disp: 90 capsule, Rfl: 0    meloxicam (MOBIC) 7 5 mg tablet, Take 1 tablet (7 5 mg total) by mouth daily for 69 days, Disp: 30 tablet, Rfl: 0    meloxicam (MOBIC) 7 5 mg tablet, Take 1 tablet (7 5 mg total) by mouth daily, Disp: 30 tablet, Rfl: 0    Patient Active Problem List   Diagnosis    Atypical chest pain    Plantar fasciitis    Pain in both feet    Congenital pes planus of right foot    Congenital pes planus of left foot    Tinea pedis of both feet          Patient ID: Shayan Thomas is a 48 y o  female      HPI    The following portions of the patient's history were reviewed and updated as appropriate:     family history includes Atrial fibrillation in her father; Snyder's esophagus in her sister; Diabetes in her father, maternal grandfather, maternal grandmother, and mother; Heart attack in her mother; Heart disease in her father and mother  reports that she has been smoking e-cigarettes  She has never used smokeless tobacco  She reports that she does not drink alcohol or use drugs  Vitals:    01/21/20 1502   Resp: 16       Review of Systems      Objective:  Patient's shoes and socks removed  Foot ExamPhysical Exam       Foot Exam     General  General Appearance: appears stated age and healthy   Orientation: alert and oriented to person, place, and time   Affect: appropriate   Gait: antalgic         Right Foot/Ankle      Inspection and Palpation  Ecchymosis: none  Tenderness: calcaneus tenderness, bony tenderness and plantar fascia   Swelling: dorsum and metatarsals   Arch: pes planus  Hammertoes: fifth toe  Hallux valgus: no  Hallux limitus: yes  Skin Exam: dry skin and tinea;      Neurovascular  Dorsalis pedis: 3+  Posterior tibial: 3+  Saphenous nerve sensation: normal  Tibial nerve sensation: normal  Superficial peroneal nerve sensation: normal  Deep peroneal nerve sensation: normal  Sural nerve sensation: normal        Left Foot/Ankle       Inspection and Palpation  Ecchymosis: none  Tenderness: none   Swelling: dorsum and metatarsals   Arch: pes planus  Hammertoes: fifth toe  Hallux valgus: no  Hallux limitus: yes  Skin Exam: dry skin and tinea;      Neurovascular  Dorsalis pedis: 3+  Posterior tibial: 3+  Saphenous nerve sensation: normal  Tibial nerve sensation: normal  Superficial peroneal nerve sensation: normal  Deep peroneal nerve sensation: normal  Sural nerve sensation: normal           Physical Exam   Constitutional: She is oriented to person, place, and time  She appears well-developed and well-nourished  Cardiovascular: Normal rate and regular rhythm     Pulses:       Dorsalis pedis pulses are 3+ on the right side, and 3+ on the left side         Posterior tibial pulses are 3+ on the right side, and 3+ on the left side  Musculoskeletal:        Right foot: There is bony tenderness    Patient is maximally pronated in stance and gait   There is pain with palpation right plantar fascia insertion    Feet:   Right Foot:   Skin Integrity: Positive for dry skin  Left Foot:   Skin Integrity: Positive for dry skin  Neurological: She is alert and oriented to person, place, and time  Skin: Skin is warm and dry  Capillary refill takes less than 2 seconds    [place] tinea pedis noted bilateral   Psychiatric: She has a normal mood and affect   Her behavior is normal  Judgment and thought content normal         Procedures

## 2021-12-02 ENCOUNTER — OFFICE VISIT (OUTPATIENT)
Dept: PODIATRY | Facility: CLINIC | Age: 56
End: 2021-12-02
Payer: COMMERCIAL

## 2021-12-02 VITALS — WEIGHT: 193 LBS | HEIGHT: 62 IN | RESPIRATION RATE: 16 BRPM | BODY MASS INDEX: 35.51 KG/M2

## 2021-12-02 DIAGNOSIS — M79.672 PAIN IN BOTH FEET: ICD-10-CM

## 2021-12-02 DIAGNOSIS — M76.829 PTTD (POSTERIOR TIBIAL TENDON DYSFUNCTION): Primary | ICD-10-CM

## 2021-12-02 DIAGNOSIS — M76.70 PERONEAL TENDINITIS, UNSPECIFIED LATERALITY: ICD-10-CM

## 2021-12-02 DIAGNOSIS — M79.671 PAIN IN BOTH FEET: ICD-10-CM

## 2021-12-02 PROCEDURE — 99213 OFFICE O/P EST LOW 20 MIN: CPT | Performed by: PODIATRIST

## 2021-12-02 RX ORDER — METHYLPREDNISOLONE 4 MG/1
TABLET ORAL
Qty: 21 TABLET | Refills: 0 | Status: SHIPPED | OUTPATIENT
Start: 2021-12-02

## 2022-04-21 ENCOUNTER — OFFICE VISIT (OUTPATIENT)
Dept: PODIATRY | Facility: CLINIC | Age: 57
End: 2022-04-21
Payer: COMMERCIAL

## 2022-04-21 VITALS — BODY MASS INDEX: 35.51 KG/M2 | WEIGHT: 193 LBS | HEIGHT: 62 IN | RESPIRATION RATE: 16 BRPM

## 2022-04-21 DIAGNOSIS — M79.671 PAIN IN BOTH FEET: ICD-10-CM

## 2022-04-21 DIAGNOSIS — M79.672 PAIN IN BOTH FEET: ICD-10-CM

## 2022-04-21 DIAGNOSIS — M76.829 PTTD (POSTERIOR TIBIAL TENDON DYSFUNCTION): Primary | ICD-10-CM

## 2022-04-21 PROCEDURE — 99213 OFFICE O/P EST LOW 20 MIN: CPT | Performed by: PODIATRIST

## 2022-04-21 RX ORDER — METHYLPREDNISOLONE 4 MG/1
TABLET ORAL
Qty: 21 TABLET | Refills: 0 | Status: SHIPPED | OUTPATIENT
Start: 2022-04-21

## 2022-04-21 RX ORDER — CLOTRIMAZOLE AND BETAMETHASONE DIPROPIONATE 10; .64 MG/G; MG/G
CREAM TOPICAL 2 TIMES DAILY
Qty: 30 G | Refills: 0 | Status: SHIPPED | OUTPATIENT
Start: 2022-04-21

## 2022-05-17 ENCOUNTER — HOSPITAL ENCOUNTER (OUTPATIENT)
Dept: RADIOLOGY | Facility: HOSPITAL | Age: 57
Discharge: HOME | End: 2022-05-17
Attending: INTERNAL MEDICINE
Payer: COMMERCIAL

## 2022-05-17 VITALS — BODY MASS INDEX: 35.51 KG/M2 | HEIGHT: 62 IN | WEIGHT: 193 LBS

## 2022-05-17 DIAGNOSIS — Z12.31 ENCOUNTER FOR SCREENING MAMMOGRAM FOR MALIGNANT NEOPLASM OF BREAST: ICD-10-CM

## 2022-05-17 PROCEDURE — 77067 SCR MAMMO BI INCL CAD: CPT

## 2022-05-17 PROCEDURE — 77063 BREAST TOMOSYNTHESIS BI: CPT

## 2022-07-15 NOTE — PROGRESS NOTES
Assessment/Plan:  Pain upon ambulation  Posterior tibial tendinitis  Osteoarthritis  Pes planus  Rule out fibromyalgia  Plan  Patient will continue use orthotics  I patient educated on RI CE therapy, patient educated on the use of proper shoe gear, patient refused injections, will start oral Medrol Dosepak, patient to return next week re-evaluation possible MRI for surgical intervention       Diagnoses and all orders for this visit:    PTTD (posterior tibial tendon dysfunction)  -     methylPREDNISolone 4 MG tablet therapy pack; Use as directed on package  -     clotrimazole-betamethasone (LOTRISONE) 1-0 05 % cream; Apply topically 2 (two) times a day    Pain in both feet          Subjective:  Patient has a new problem  Patient has pain upon ambulation  Patient has pain on the outside of her feet  This is a new finding  No history of trauma  Patient has low back problems    She also has restless leg syndrome    Past Medical History:   Diagnosis Date    Anxiety     Psychiatric disorder     anxiety       Past Surgical History:   Procedure Laterality Date     SECTION      HYSTERECTOMY      TONSILLECTOMY         No Known Allergies      Current Outpatient Medications:     clotrimazole-betamethasone (LOTRISONE) 1-0 05 % cream, Apply topically 2 (two) times a day, Disp: 30 g, Rfl: 0    escitalopram (LEXAPRO) 10 mg tablet, , Disp: , Rfl: 2    gabapentin (NEURONTIN) 100 mg capsule, Take 1 capsule (100 mg total) by mouth 3 (three) times a day, Disp: 90 capsule, Rfl: 0    meloxicam (MOBIC) 7 5 mg tablet, Take 1 tablet (7 5 mg total) by mouth daily, Disp: 30 tablet, Rfl: 0    methylPREDNISolone 4 MG tablet therapy pack, Use as directed on package, Disp: 21 tablet, Rfl: 0    methylPREDNISolone 4 MG tablet therapy pack, Use as directed on package, Disp: 21 tablet, Rfl: 0    Patient Active Problem List   Diagnosis    Atypical chest pain    Plantar fasciitis    Pain in both feet    Congenital pes planus of right foot    Congenital pes planus of left foot    Tinea pedis of both feet          Patient ID: Sanna He is a 56 y o  female  Return patient a follow-up on bilateral foot pain, patient is having a new complaint that the pain is currently at the bottom of the inside of arch, patient is ambulating in supportive orthotics with mild relief      The following portions of the patient's history were reviewed and updated as appropriate:     family history includes Atrial fibrillation in her father; Snyder's esophagus in her sister; Diabetes in her father, maternal grandfather, maternal grandmother, and mother; Heart attack in her mother; Heart disease in her father and mother  reports that she has been smoking e-cigarettes  She has never used smokeless tobacco  She reports that she does not drink alcohol and does not use drugs  Vitals:    04/21/22 1639   Resp: 16       Review of Systems   Constitutional: Negative  Respiratory: Negative  Cardiovascular: Negative  Musculoskeletal: Positive for arthralgias  Skin: Negative  Objective:  Patient's shoes and socks removed  Foot Exam    Right Foot/Ankle     Inspection and Palpation  Skin Exam: no blister, no maceration, no ulcer and no erythema     Neurovascular  Dorsalis pedis: 2+  Posterior tibial: 2+      Left Foot/Ankle      Inspection and Palpation  Skin Exam: no blister, no maceration, no ulcer and no erythema     Neurovascular  Dorsalis pedis: 2+  Posterior tibial: 2+        Physical Exam  Constitutional:       General: She is not in acute distress  Appearance: She is well-developed  She is not ill-appearing, toxic-appearing or diaphoretic  HENT:      Head: Normocephalic and atraumatic  Cardiovascular:      Pulses: Normal pulses  Dorsalis pedis pulses are 2+ on the right side and 2+ on the left side  Posterior tibial pulses are 2+ on the right side and 2+ on the left side        Comments: Palpable pedal pulse, CFT is less than 3 seconds, temperature gradient within normal limits, pedal hair present, no trophic skin changes  Pulmonary:      Effort: No respiratory distress  Musculoskeletal:         General: Normal range of motion  Comments: Pain on palpation along posterior tibial tendon insertion in toe medial foot, pain on palpation to prominent talar head medially, pain on palpation along the insertion of the peroneal tendon into lateral foot, patient is unable to perform double heel raise test   Feet:      Right foot:      Protective Sensation: 10 sites tested  10 sites sensed  Skin integrity: No ulcer, blister, skin breakdown or erythema  Left foot:      Protective Sensation: 10 sites tested  10 sites sensed  Skin integrity: No ulcer, blister, skin breakdown or erythema  Skin:     Capillary Refill: Capillary refill takes 2 to 3 seconds  Coloration: Skin is not pale  Neurological:      Mental Status: She is alert and oriented to person, place, and time  Comments:  muscle power 5/5, protective sensations intact, no focal motor deficit         S      Foot Exam     General  General Appearance: appears stated age and healthy   Orientation: alert and oriented to person, place, and time   Affect: appropriate   Gait: antalgic         Right Foot/Ankle      Inspection and Palpation  Ecchymosis: none  Tenderness: calcaneus tenderness, bony tenderness and plantar fascia   Swelling: dorsum and metatarsals   Arch: pes planus  Hammertoes: fifth toe  Hallux valgus: no  Hallux limitus: yes  Skin Exam: dry skin and tinea;      Neurovascular  Dorsalis pedis: 3+  Posterior tibial: 3+  Saphenous nerve sensation: normal  Tibial nerve sensation: normal  Superficial peroneal nerve sensation: normal  Deep peroneal nerve sensation: normal  Sural nerve sensation: normal        Left Foot/Ankle       Inspection and Palpation  Ecchymosis: none  Tenderness: none   Swelling: dorsum and metatarsals   Arch: pes planus  Hammertoes: fifth toe  Hallux valgus: no  Hallux limitus: yes  Skin Exam: dry skin and tinea;      Neurovascular  Dorsalis pedis: 3+  Posterior tibial: 3+  Saphenous nerve sensation: normal  Tibial nerve sensation: normal  Superficial peroneal nerve sensation: normal  Deep peroneal nerve sensation: normal  Sural nerve sensation: normal           Physical Exam   Constitutional: She is oriented to person, place, and time  She appears well-developed and well-nourished  Cardiovascular: Normal rate and regular rhythm  Pulses:       Dorsalis pedis pulses are 3+ on the right side, and 3+ on the left side         Posterior tibial pulses are 3+ on the right side, and 3+ on the left side  Musculoskeletal:        Right foot: There is bony tenderness    Patient is maximally pronated in stance and gait   There is pain with palpation right plantar fascia insertion    Feet:   Right Foot:   Skin Integrity: Positive for dry skin  Left Foot:   Skin Integrity: Positive for dry skin  Neurological: She is alert and oriented to person, place, and time  Skin: Skin is warm and dry  Capillary refill takes less than 2 seconds    Switzer tinea pedis noted bilateral   Psychiatric: She has a normal mood and affect   Her behavior is normal  Judgment and thought content normal         Procedures

## 2022-09-09 ENCOUNTER — OFFICE VISIT (OUTPATIENT)
Dept: URGENT CARE | Facility: CLINIC | Age: 57
End: 2022-09-09
Payer: COMMERCIAL

## 2022-09-09 VITALS
TEMPERATURE: 98.1 F | HEART RATE: 58 BPM | OXYGEN SATURATION: 99 % | DIASTOLIC BLOOD PRESSURE: 70 MMHG | WEIGHT: 191 LBS | BODY MASS INDEX: 35.15 KG/M2 | SYSTOLIC BLOOD PRESSURE: 130 MMHG | HEIGHT: 62 IN | RESPIRATION RATE: 20 BRPM

## 2022-09-09 DIAGNOSIS — H69.91 DISORDER OF RIGHT EUSTACHIAN TUBE: ICD-10-CM

## 2022-09-09 DIAGNOSIS — L01.00 IMPETIGO: Primary | ICD-10-CM

## 2022-09-09 PROCEDURE — 99213 OFFICE O/P EST LOW 20 MIN: CPT | Performed by: PHYSICIAN ASSISTANT

## 2022-09-09 NOTE — LETTER
September 9, 2022     Patient: Ricci Arriaga   YOB: 1965   Date of Visit: 9/9/2022       To Whom It May Concern: It is my medical opinion that Ricci Arriaga needs to be excused from work today 9/9/22  If you have any questions or concerns, please don't hesitate to call           Sincerely,        Monroe Lesch, PA-C    CC: No Recipients

## 2022-09-09 NOTE — PATIENT INSTRUCTIONS
Impetigo   WHAT YOU NEED TO KNOW:   Impetigo is a skin infection caused by bacteria  The infection can cause sores to form anywhere on your body  The sores develop watery or pus-filled blisters that break and form thick crusts  Impetigo is most common in children and spreads easily from person to person  DISCHARGE INSTRUCTIONS:   Return to the emergency department if:   You have painful, red, warm skin around the blisters  Your face is swollen  You urinate less than usual or there is blood in your urine  Contact your healthcare provider if:   You have a fever  The sores become more red, swollen, warm, or tender  The sores do not start to heal after 3 days of treatment  You have questions or concerns about your condition or care  Medicines:   Antibiotics  treat the bacterial infection  Antibiotics may be given as a pill or cream  Wash your skin and gently remove any crusts before you apply the antibiotic cream      Take your medicine as directed  Contact your healthcare provider if you think your medicine is not helping or if you have side effects  Tell him or her if you are allergic to any medicine  Keep a list of the medicines, vitamins, and herbs you take  Include the amounts, and when and why you take them  Bring the list or the pill bottles to follow-up visits  Carry your medicine list with you in case of an emergency  Prevent the spread of impetigo:   Avoid direct contact  You can spread impetigo if someone touches or uses something that touched your infected skin  You can also spread impetigo on your own body when you touch the area and then touch somewhere else  Keep the sores covered with gauze so you will not scratch or touch them  Keep your fingernails short  Your child may need to wear mittens so he does not scratch his sores  Wash your hands often  Always wash your hands after you touch the infected area  Wash your hands before you touch food, your eyes, or other people   If no water is available, use an alcohol-based gel to clean your hands  Wash household items  Do not share or reuse items that have come in contact with impetigo sores  Examples include bedding, towels, washcloths, and eating utensils  These items may be used again after they have been washed with hot water and soap  Clean your sores safely:  Wash your skin sores with antibacterial soap and water  You may need to do this 2 to 3 times each day until the sores heal  If the area is crusted, gently wash the sores with gauze or a clean washcloth to remove the crust  Pat the area dry with a clean towel  Wash your hands, the washcloth, and the towel after you clean the area around the sores  Return to work or school: You may return to work or school 48 hours after you start the antibiotic medicine  If your child has impetigo, tell his school or  center about the infection  Follow up with your doctor as directed:  Write down your questions so you remember to ask them during your visits  © Copyright [employer] 2022 Information is for End User's use only and may not be sold, redistributed or otherwise used for commercial purposes  All illustrations and images included in CareNotes® are the copyrighted property of [employer] Inc  or [facility] Marlen Fraser   The above information is an  only  It is not intended as medical advice for individual conditions or treatments  Talk to your doctor, nurse or pharmacist before following any medical regimen to see if it is safe and effective for you  Eustachian Tube Dysfunction   AMBULATORY CARE:   Eustachian tube dysfunction (ETD)  is a condition that prevents your eustachian tubes from opening properly  It can also cause them to become blocked  Eustachian tubes connect your middle ear to the back of your nose and throat  These tubes open and allow air to flow in and out when you sneeze, swallow, or yawn         Common signs and symptoms include the following:   Fullness or pressure in your ears    Muffled hearing, or a feeling you are hearing under water or have clogged ears    Pain in one or both ears    Ringing in your ears    Popping, crackling, or clicking feeling in your ears    Trouble keeping your balance    Call your doctor or otolaryngologist if:   Your symptoms do not improve or get worse  You have a fever  You have any hearing loss  You have questions or concerns about your condition or care  Treatment:  ETD may get better on its own without any treatment  If it continues, you may need any of the following:  Swallow, yawn, or chew gum  to help open your eustachian tubes  Your healthcare provider may also recommend you blow with your mouth shut and your nostrils pinched closed  Air pressure devices  push air into your nose and eustachian tubes to help relieve air pressure in your ear  Treatment for allergies  such as decongestants, antihistamines, and nasal steroids may improve ETD  They may help decrease swelling of the eustachian tubes  A myringotomy  is surgery to make a hole in your eardrum  The hole relieves pressure and lets fluid drain from your ear  A pressure equalizing (PE) tube may be used to keep the hole open and to help drain fluid  Tuboplasty  is a procedure to widen your eustachian tubes  Follow up with your doctor or otolaryngologist as directed:  Write down your questions so you remember to ask them during your visits  © [employer] 2022 Information is for End User's use only and may not be sold, redistributed or otherwise used for commercial purposes  All illustrations and images included in CareNotes® are the copyrighted property of [employer]  or [facility] Marlen [employer]   The above information is an  only  It is not intended as medical advice for individual conditions or treatments   Talk to your doctor, nurse or pharmacist before following any medical regimen to see if it is safe and effective for you  Eustachian tube dysfunction:   -No sign of infection on exam    -Air pressure device may relieve your sx  -Fluticasone Nasal Spray for PND and congestion  -You can use OTC zyrtec or claritin    -Use a humidifier next to your bed  Take steam showers  -You can take Advil or Tylenol for pain  -Stay very well hydrated and rest   -If your symptoms persist or worsen follow up immediately with your PCP     Impetigo:   -Will prescribe Bactroban to apply topically as prescribed  Monitor for signs of worsening infection like swelling, worsening redness, drainage, fever or chills  Follow up immediately if you have these symptoms

## 2022-09-09 NOTE — PROGRESS NOTES
[address]        NAME: Neisha Downey is a 56 y o  female  : 1965    MRN: [id number]  DATE: 2022  TIME: 2:44 PM    Assessment and Plan   Impetigo [L01 00]  1  Impetigo  mupirocin (BACTROBAN) 2 % ointment   2  Disorder of right eustachian tube           Patient Instructions   Eustachian tube dysfunction:   -No sign of infection on exam    -Air pressure device may relieve your sx  -Fluticasone Nasal Spray for PND and congestion  -You can use OTC zyrtec or claritin    -Use a humidifier next to your bed  Take steam showers  -You can take Advil or Tylenol for pain  -Stay very well hydrated and rest   -If your symptoms persist or worsen follow up immediately with your PCP     Impetigo:   -Will prescribe Bactroban to apply topically as prescribed  Monitor for signs of worsening infection like swelling, worsening redness, drainage, fever or chills  Follow up immediately if you have these symptoms  Follow up with PCP in 3-5 days  Proceed to  ER if symptoms worsen  Chief Complaint     Chief Complaint   Patient presents with   Anjel Hidalgo     PT reports of right sided ear pain started approx 1 week ago  History of Present Illness       The patient presents today for R sided ear pain x 1 week  The patient states that she has been experiencing intermittent ear pain, headache and fatigue  She states that she has no fever, chills, body aches  No otorrhea or hearing loss  No dizziness or weakness  No OTC measures attempted  No sick contacts or recent travel  She would like a work note today  She also presents today for redness above the lip area x 1 week  She states that she was diagnosed with impetigo but was unable to  the rx  She states that it began as sunburn that got more red and swollen and began to drain yellow drainage  The redness and drainage has improved but she has some mild crusting  She has no fever or chills  No facial swelling   She has been putting clotrimazole betamethasone cream on it  Review of Systems   Review of Systems   Constitutional: Negative for activity change, appetite change, chills, diaphoresis, fatigue and fever  HENT: Positive for ear pain  Negative for congestion, ear discharge, facial swelling, hearing loss, postnasal drip, rhinorrhea, sinus pressure, sinus pain, sore throat, tinnitus, trouble swallowing and voice change  Eyes: Negative for visual disturbance  Respiratory: Negative for apnea, cough, chest tightness, shortness of breath, wheezing and stridor  Cardiovascular: Negative for chest pain, palpitations and leg swelling  Gastrointestinal: Negative for abdominal distention and abdominal pain  Genitourinary: Negative for decreased urine volume  Musculoskeletal: Negative for arthralgias, joint swelling, myalgias, neck pain and neck stiffness  Skin: Positive for color change and rash  Allergic/Immunologic: Negative for immunocompromised state  Neurological: Negative for dizziness, weakness, light-headedness, numbness and headaches  Hematological: Negative for adenopathy           Current Medications       Current Outpatient Medications:     mupirocin (BACTROBAN) 2 % ointment, Apply topically 3 (three) times a day, Disp: 22 g, Rfl: 0    clotrimazole-betamethasone (LOTRISONE) 1-0 05 % cream, Apply topically 2 (two) times a day, Disp: 30 g, Rfl: 0    escitalopram (LEXAPRO) 10 mg tablet, , Disp: , Rfl: 2    gabapentin (NEURONTIN) 100 mg capsule, Take 1 capsule (100 mg total) by mouth 3 (three) times a day, Disp: 90 capsule, Rfl: 0    meloxicam (MOBIC) 7 5 mg tablet, Take 1 tablet (7 5 mg total) by mouth daily, Disp: 30 tablet, Rfl: 0    methylPREDNISolone 4 MG tablet therapy pack, Use as directed on package, Disp: 21 tablet, Rfl: 0    methylPREDNISolone 4 MG tablet therapy pack, Use as directed on package, Disp: 21 tablet, Rfl: 0    Current Allergies     Allergies as of 09/09/2022    (No Known Allergies) The following portions of the patient's history were reviewed and updated as appropriate: allergies, current medications, past family history, past medical history, past social history, past surgical history and problem list      Past Medical History:   Diagnosis Date    Anxiety     Psychiatric disorder     anxiety       Past Surgical History:   Procedure Laterality Date     SECTION      HYSTERECTOMY      TONSILLECTOMY         Family History   Problem Relation Age of Onset    Heart disease Mother     Heart attack Mother     Diabetes Mother     Diabetes Father     Heart disease Father     Atrial fibrillation Father     Snyder's esophagus Sister     Diabetes Maternal Grandmother     Diabetes Maternal Grandfather          Medications have been verified  Objective   /70   Pulse 58   Temp 98 1 °F (36 7 °C)   Resp 20   Ht 5' 2" (1 575 m)   Wt 86 6 kg (191 lb)   SpO2 99%   BMI 34 93 kg/m²   No LMP recorded  Patient has had a hysterectomy  Physical Exam     Physical Exam  Vitals and nursing note reviewed  Constitutional:       General: She is not in acute distress  Appearance: She is well-developed  She is not diaphoretic  HENT:      Head: Normocephalic and atraumatic  Right Ear: Hearing, tympanic membrane, ear canal and external ear normal       Left Ear: Hearing, tympanic membrane, ear canal and external ear normal       Nose: Nose normal  No mucosal edema or rhinorrhea  Right Sinus: No maxillary sinus tenderness or frontal sinus tenderness  Left Sinus: No maxillary sinus tenderness or frontal sinus tenderness  Mouth/Throat:      Pharynx: Uvula midline  No oropharyngeal exudate, posterior oropharyngeal erythema or uvula swelling  Tonsils: No tonsillar abscesses  Cardiovascular:      Rate and Rhythm: Normal rate and regular rhythm  Heart sounds: S1 normal and S2 normal  Heart sounds not distant  No murmur heard      No friction rub  No gallop  No S3 or S4 sounds  Pulmonary:      Effort: No tachypnea, bradypnea, accessory muscle usage or respiratory distress  Breath sounds: No decreased breath sounds, wheezing, rhonchi or rales  Musculoskeletal:      Cervical back: Normal range of motion and neck supple  Lymphadenopathy:      Cervical: No cervical adenopathy  Skin:     General: Skin is warm and dry  Capillary Refill: Capillary refill takes less than 2 seconds  Comments: There is an erythematous macular rash over the upper lip/philtrum that has an overlying honey colored crust  No oozing or drainage  No warmth to touch  Neurological:      Mental Status: She is alert and oriented to person, place, and time     Psychiatric:         Behavior: Behavior normal

## 2022-10-03 ENCOUNTER — OFFICE VISIT (OUTPATIENT)
Dept: INTERNAL MEDICINE CLINIC | Facility: CLINIC | Age: 57
End: 2022-10-03
Payer: COMMERCIAL

## 2022-10-03 VITALS
HEART RATE: 70 BPM | RESPIRATION RATE: 15 BRPM | WEIGHT: 191 LBS | OXYGEN SATURATION: 98 % | TEMPERATURE: 97.6 F | HEIGHT: 62 IN | BODY MASS INDEX: 35.15 KG/M2 | DIASTOLIC BLOOD PRESSURE: 80 MMHG | SYSTOLIC BLOOD PRESSURE: 124 MMHG

## 2022-10-03 DIAGNOSIS — M15.9 PRIMARY OSTEOARTHRITIS INVOLVING MULTIPLE JOINTS: Primary | ICD-10-CM

## 2022-10-03 DIAGNOSIS — R73.9 HYPERGLYCEMIA: ICD-10-CM

## 2022-10-03 DIAGNOSIS — M72.2 PLANTAR FASCIITIS: ICD-10-CM

## 2022-10-03 DIAGNOSIS — D64.9 ANEMIA, UNSPECIFIED TYPE: ICD-10-CM

## 2022-10-03 DIAGNOSIS — E55.9 VITAMIN D DEFICIENCY: ICD-10-CM

## 2022-10-03 DIAGNOSIS — R79.89 ABNORMAL LFTS: ICD-10-CM

## 2022-10-03 DIAGNOSIS — E66.9 OBESITY (BMI 30-39.9): ICD-10-CM

## 2022-10-03 PROBLEM — M15.0 PRIMARY OSTEOARTHRITIS INVOLVING MULTIPLE JOINTS: Status: ACTIVE | Noted: 2022-10-03

## 2022-10-03 PROCEDURE — 99213 OFFICE O/P EST LOW 20 MIN: CPT | Performed by: INTERNAL MEDICINE

## 2022-10-03 RX ORDER — PHENTERMINE AND TOPIRAMATE 7.5; 46 MG/1; MG/1
1 CAPSULE, EXTENDED RELEASE ORAL DAILY
Qty: 30 CAPSULE | Refills: 0 | Status: SHIPPED | OUTPATIENT
Start: 2022-10-03

## 2022-10-03 NOTE — PROGRESS NOTES
Dr Carley Dewey Office Visit Note  10/03/22     David Plata 56 y o  female MRN: [id number]  : 1965    Assessment:     1  Primary osteoarthritis involving multiple joints  Assessment & Plan:  Continue Mobic 15 mg daily      2  Abnormal LFTs  -     Comprehensive metabolic panel; Future    3  Anemia, unspecified type  Assessment & Plan:  Check CBC    Orders:  -     CBC and differential; Future    4  Hyperglycemia  Assessment & Plan:  Check hemoglobin A1c for diabetes    Orders:  -     Hemoglobin A1C; Future    5  Obesity (BMI 30-39  9)  Assessment & Plan:  Patient has some difficulty breathing back pain plantar fasciitis foot pain digit in degenerative joint disease multiple joints patient was prescribed qsymia    Orders:  -     Phentermine-Topiramate (Qsymia) 7 5-46 MG CP24; Take 1 tablet by mouth daily    6  Vitamin D deficiency  -     Vitamin D Panel; Future    7  Plantar fasciitis  Assessment & Plan:  Continue Mobic 15 mg daily evaluated by podiatrist          Discussion Summary and Plan: Today's care plan and medications were reviewed with patient in detail and all their questions answered to their satisfaction  Chief Complaint   Patient presents with    Weight Loss     Want medication for weight loss, asked about topamax and wegovy    Arthritis    Obesity    Pain      Subjective:  Came in complaining of pain multiple joints both knee hip ankle also pain in the foot gaining weight not able to move rate no other new symptoms no other new medical problems      The following portions of the patient's history were reviewed and updated as appropriate: allergies, current medications, past family history, past medical history, past social history, past surgical history and problem list   BMI Counseling: Body mass index is 34 93 kg/m²   The BMI is above normal  Nutrition recommendations include decreasing portion sizes, encouraging healthy choices of fruits and vegetables, decreasing fast food intake, consuming healthier snacks, limiting drinks that contain sugar, moderation in carbohydrate intake, increasing intake of lean protein, reducing intake of saturated and trans fat and reducing intake of cholesterol  Exercise recommendations include vigorous physical activity 75 minutes/week  Pharmacotherapy was ordered to help aid in weight loss  Patient referred to PCP  Rationale for BMI follow-up plan is due to patient being overweight or obese  Depression Screening and Follow-up Plan: Patient was screened for depression during today's encounter  They screened negative with a PHQ-2 score of 0  Review of Systems   Constitutional: Positive for fatigue  Negative for activity change, appetite change, chills, diaphoresis, fever and unexpected weight change  HENT: Negative for congestion, dental problem, drooling, ear discharge, ear pain, facial swelling, hearing loss, mouth sores, nosebleeds, postnasal drip, rhinorrhea, sinus pressure, sneezing, sore throat, tinnitus, trouble swallowing and voice change  Eyes: Negative for photophobia, pain, discharge, redness, itching and visual disturbance  Respiratory: Negative for apnea, cough, choking, chest tightness, shortness of breath, wheezing and stridor  Cardiovascular: Negative for chest pain, palpitations and leg swelling  Gastrointestinal: Negative for abdominal distention, abdominal pain, anal bleeding, blood in stool, constipation, diarrhea, nausea, rectal pain and vomiting  Endocrine: Negative for cold intolerance, heat intolerance, polydipsia, polyphagia and polyuria  Genitourinary: Negative for decreased urine volume, difficulty urinating, dysuria, enuresis, flank pain, frequency, genital sores, hematuria and urgency  Musculoskeletal: Positive for arthralgias, back pain, gait problem and joint swelling  Negative for myalgias, neck pain and neck stiffness  Skin: Negative for color change, pallor, rash and wound  Allergic/Immunologic: Negative  Negative for environmental allergies, food allergies and immunocompromised state  Neurological: Negative for dizziness, tremors, seizures, syncope, facial asymmetry, speech difficulty, weakness, light-headedness, numbness and headaches  Psychiatric/Behavioral: Negative for agitation, behavioral problems, confusion, decreased concentration, dysphoric mood, hallucinations, self-injury, sleep disturbance and suicidal ideas  The patient is not nervous/anxious and is not hyperactive  Historical Information   Patient Active Problem List   Diagnosis    Atypical chest pain    Plantar fasciitis    Pain in both feet    Congenital pes planus of right foot    Congenital pes planus of left foot    Tinea pedis of both feet    Disorder of right eustachian tube    Impetigo    Obesity (BMI 30-39  9)    Abnormal LFTs    Anemia    Hyperglycemia    Primary osteoarthritis involving multiple joints     Past Medical History:   Diagnosis Date    Anxiety     Psychiatric disorder     anxiety     Past Surgical History:   Procedure Laterality Date     SECTION      HYSTERECTOMY      MAMMO (HISTORICAL)  2022    TONSILLECTOMY       Social History     Substance and Sexual Activity   Alcohol Use No     Social History     Substance and Sexual Activity   Drug Use No     Social History     Tobacco Use   Smoking Status Light Tobacco Smoker    Types: E-Cigarettes   Smokeless Tobacco Never Used   Tobacco Comment    vape     Family History   Problem Relation Age of Onset    Heart disease Mother     Heart attack Mother     Diabetes Mother     Diabetes Father     Heart disease Father     Atrial fibrillation Father     Snyder's esophagus Sister     Diabetes Maternal Grandmother     Diabetes Maternal Grandfather      Health Maintenance Due   Topic    Hepatitis C Screening     COVID-19 Vaccine (1)    Pneumococcal Vaccine: Pediatrics (0 to 5 Years) and At-Risk Patients (6 to 59 Years) (1 - PCV)    HIV Screening     BMI: Followup Plan     Annual Physical     DTaP,Tdap,and Td Vaccines (1 - Tdap)    Cervical Cancer Screening     Colorectal Cancer Screening     Influenza Vaccine (1)      Meds/Allergies       Current Outpatient Medications:     escitalopram (LEXAPRO) 10 mg tablet, , Disp: , Rfl: 2    meloxicam (MOBIC) 7 5 mg tablet, Take 1 tablet (7 5 mg total) by mouth daily, Disp: 30 tablet, Rfl: 0    Phentermine-Topiramate (Qsymia) 7 5-46 MG CP24, Take 1 tablet by mouth daily, Disp: 30 capsule, Rfl: 0    clotrimazole-betamethasone (LOTRISONE) 1-0 05 % cream, Apply topically 2 (two) times a day (Patient not taking: Reported on 10/3/2022), Disp: 30 g, Rfl: 0    gabapentin (NEURONTIN) 100 mg capsule, Take 1 capsule (100 mg total) by mouth 3 (three) times a day, Disp: 90 capsule, Rfl: 0    methylPREDNISolone 4 MG tablet therapy pack, Use as directed on package (Patient not taking: Reported on 10/3/2022), Disp: 21 tablet, Rfl: 0    methylPREDNISolone 4 MG tablet therapy pack, Use as directed on package (Patient not taking: Reported on 10/3/2022), Disp: 21 tablet, Rfl: 0    mupirocin (BACTROBAN) 2 % ointment, Apply topically 3 (three) times a day (Patient not taking: Reported on 10/3/2022), Disp: 22 g, Rfl: 0      Objective:    Vitals:   /80   Pulse 70   Temp 97 6 °F (36 4 °C)   Resp 15   Ht 5' 2" (1 575 m)   Wt 86 6 kg (191 lb)   SpO2 98%   BMI 34 93 kg/m²   Body mass index is 34 93 kg/m²  Vitals:    10/03/22 1625   Weight: 86 6 kg (191 lb)       Physical Exam  Constitutional:       General: She is not in acute distress  Appearance: She is well-developed  She is obese  She is not ill-appearing, toxic-appearing or diaphoretic  HENT:      Head: Normocephalic and atraumatic  Right Ear: External ear normal       Left Ear: External ear normal       Nose: Nose normal       Mouth/Throat:      Pharynx: No oropharyngeal exudate     Eyes:      General: Lids are normal  Lids are everted, no foreign bodies appreciated  No scleral icterus  Right eye: No discharge  Left eye: No discharge  Conjunctiva/sclera: Conjunctivae normal       Pupils: Pupils are equal, round, and reactive to light  Neck:      Thyroid: No thyromegaly  Vascular: Normal carotid pulses  No carotid bruit, hepatojugular reflux or JVD  Trachea: No tracheal tenderness or tracheal deviation  Cardiovascular:      Rate and Rhythm: Normal rate and regular rhythm  Pulses: Normal pulses  Heart sounds: Normal heart sounds  No murmur heard  No friction rub  No gallop  Pulmonary:      Effort: Pulmonary effort is normal  No respiratory distress  Breath sounds: Normal breath sounds  No stridor  No wheezing or rales  Chest:      Chest wall: No tenderness  Abdominal:      General: Bowel sounds are normal  There is no distension  Palpations: Abdomen is soft  There is no mass  Tenderness: There is no abdominal tenderness  There is no guarding or rebound  Musculoskeletal:         General: No tenderness or deformity  Normal range of motion  Cervical back: Normal range of motion and neck supple  No edema, erythema or rigidity  No spinous process tenderness or muscular tenderness  Normal range of motion  Lymphadenopathy:      Head:      Right side of head: No submental, submandibular, tonsillar, preauricular or posterior auricular adenopathy  Left side of head: No submental, submandibular, tonsillar, preauricular, posterior auricular or occipital adenopathy  Cervical: No cervical adenopathy  Right cervical: No superficial, deep or posterior cervical adenopathy  Left cervical: No superficial, deep or posterior cervical adenopathy  Upper Body:      Right upper body: No pectoral adenopathy  Left upper body: No pectoral adenopathy  Skin:     General: Skin is warm and dry  Coloration: Skin is not pale  Findings: No erythema or rash  Neurological:      Mental Status: She is alert and oriented to person, place, and time  Cranial Nerves: No cranial nerve deficit  Sensory: No sensory deficit  Motor: No tremor, abnormal muscle tone or seizure activity  Coordination: Coordination normal       Gait: Gait normal       Deep Tendon Reflexes: Reflexes are normal and symmetric  Reflexes normal    Psychiatric:         Behavior: Behavior normal          Thought Content: Thought content normal          Judgment: Judgment normal          Lab Review   No visits with results within 2 Month(s) from this visit  Latest known visit with results is:   Hospital Outpatient Visit on 09/28/2018   Component Date Value Ref Range Status    Protocol Name 09/28/2018 [facility]-MILAGROS   Final    Time In Exercise Phase 09/28/2018 00:08:37   Final    MAX  SYSTOLIC BP 47/93/6762 546  mmHg Final    Max Diastolic Bp 84/64/9477 70  mmHg Final    Max Heart Rate 09/28/2018 160  BPM Final    Max Predicted Heart Rate 09/28/2018 169  BPM Final    Reason for Termination 09/28/2018    Final                    Value:Dyspnea  Target Heart Rate Achieved      Test Indication 09/28/2018 Abnormal Treadmill Test   Final    Target Hr Formular 09/28/2018 (220 - Age)*100%   Final    Chest Pain Statement 09/28/2018 none   Final         Patient Instructions   Pt is symptom free for this problem  This diagnosis or problem is stable/well controlled  Patient is advised to continue same meds as outlined in medicine list            Zbigniew Dyesharlene        "This note has been constructed using a voice recognition system  Therefore there may be syntax, spelling, and/or grammatical errors   Please call if you have any questions  "

## 2022-10-03 NOTE — ASSESSMENT & PLAN NOTE
Patient has some difficulty breathing back pain plantar fasciitis foot pain digit in degenerative joint disease multiple joints patient was prescribed qsymia

## 2022-10-07 LAB — HBA1C MFR BLD HPLC: 6 %

## 2022-10-18 DIAGNOSIS — E66.9 OBESITY (BMI 30-39.9): ICD-10-CM

## 2022-10-20 ENCOUNTER — TELEPHONE (OUTPATIENT)
Dept: INTERNAL MEDICINE CLINIC | Facility: CLINIC | Age: 57
End: 2022-10-20

## 2022-11-08 PROBLEM — L01.00 IMPETIGO: Status: RESOLVED | Noted: 2022-09-09 | Resolved: 2022-11-08

## 2023-05-31 ENCOUNTER — OFFICE VISIT (OUTPATIENT)
Dept: URGENT CARE | Facility: CLINIC | Age: 58
End: 2023-05-31

## 2023-05-31 VITALS
TEMPERATURE: 97.7 F | WEIGHT: 182 LBS | RESPIRATION RATE: 18 BRPM | SYSTOLIC BLOOD PRESSURE: 125 MMHG | HEART RATE: 84 BPM | BODY MASS INDEX: 33.29 KG/M2 | OXYGEN SATURATION: 93 % | DIASTOLIC BLOOD PRESSURE: 71 MMHG

## 2023-05-31 DIAGNOSIS — R05.1 ACUTE COUGH: Primary | ICD-10-CM

## 2023-05-31 RX ORDER — BENZONATATE 200 MG/1
200 CAPSULE ORAL 3 TIMES DAILY PRN
Qty: 20 CAPSULE | Refills: 0 | Status: SHIPPED | OUTPATIENT
Start: 2023-05-31

## 2023-05-31 RX ORDER — PREDNISONE 20 MG/1
20 TABLET ORAL EVERY MORNING
Qty: 5 TABLET | Refills: 0 | Status: SHIPPED | OUTPATIENT
Start: 2023-05-31 | End: 2023-06-05

## 2023-05-31 NOTE — PROGRESS NOTES
[facility]        NAME: Mirna Pina is a 66 y o  female  : 1965    MRN: [id number]  DATE: May 31, 2023  TIME: 4:18 PM    Assessment and Plan   Acute cough [R05 1]  1  Acute cough  predniSONE 20 mg tablet    benzonatate (TESSALON) 200 MG capsule        Likely experiencing a viral URI with characteristics of a very early/mild bronchitis  We will treat with 5 days of low-dose steroid taken in the morning and as needed Tessalon Perles  Patient Instructions     Follow up with PCP in 3-5 days  Proceed to  ER if symptoms worsen  Chief Complaint     Chief Complaint   Patient presents with   • Cough     X Thursday    • Sore Throat         History of Present Illness       53-year-old female presents today with about 2 days of productive coughing associated with chest pain  This is occurring within the setting of URI symptoms including sinus pressure, fatigue and rhinorrhea that started about 6 days ago  Has taken some over-the-counter remedies such as [place] which was somewhat effective  Review of Systems   Review of Systems   Constitutional: Positive for fatigue  Negative for chills and fever  HENT: Positive for sinus pressure  Respiratory: Positive for cough  Cardiovascular: Positive for chest pain (with cough)  Gastrointestinal: Negative for abdominal pain and nausea  Neurological: Negative for dizziness and headaches           Current Medications       Current Outpatient Medications:   •  benzonatate (TESSALON) 200 MG capsule, Take 1 capsule (200 mg total) by mouth 3 (three) times a day as needed for cough, Disp: 20 capsule, Rfl: 0  •  predniSONE 20 mg tablet, Take 1 tablet (20 mg total) by mouth every morning for 5 days, Disp: 5 tablet, Rfl: 0  •  clotrimazole-betamethasone (LOTRISONE) 1-0 05 % cream, Apply topically 2 (two) times a day (Patient not taking: Reported on 10/3/2022), Disp: 30 g, Rfl: 0  •  escitalopram (LEXAPRO) 10 mg tablet, , Disp: , Rfl: 2  • gabapentin (NEURONTIN) 100 mg capsule, Take 1 capsule (100 mg total) by mouth 3 (three) times a day, Disp: 90 capsule, Rfl: 0  •  meloxicam (MOBIC) 7 5 mg tablet, Take 1 tablet (7 5 mg total) by mouth daily, Disp: 30 tablet, Rfl: 0  •  methylPREDNISolone 4 MG tablet therapy pack, Use as directed on package (Patient not taking: Reported on 10/3/2022), Disp: 21 tablet, Rfl: 0  •  methylPREDNISolone 4 MG tablet therapy pack, Use as directed on package (Patient not taking: Reported on 10/3/2022), Disp: 21 tablet, Rfl: 0  •  mupirocin (BACTROBAN) 2 % ointment, Apply topically 3 (three) times a day (Patient not taking: Reported on 10/3/2022), Disp: 22 g, Rfl: 0  •  Phentermine-Topiramate (Qsymia) 7 5-46 MG CP24, Take 1 tablet by mouth daily, Disp: 30 capsule, Rfl: 0    Current Allergies     Allergies as of 2023   • (No Known Allergies)            The following portions of the patient's history were reviewed and updated as appropriate: allergies, current medications, past family history, past medical history, past social history, past surgical history and problem list      Past Medical History:   Diagnosis Date   • Anxiety    • Psychiatric disorder     anxiety       Past Surgical History:   Procedure Laterality Date   •  SECTION     • HYSTERECTOMY     • MAMMO (HISTORICAL)  2022   • TONSILLECTOMY         Family History   Problem Relation Age of Onset   • Heart disease Mother    • Heart attack Mother    • Diabetes Mother    • Diabetes Father    • Heart disease Father    • Atrial fibrillation Father    • Snyder's esophagus Sister    • Diabetes Maternal Grandmother    • Diabetes Maternal Grandfather          Medications have been verified  Objective   /71   Pulse 84   Temp 97 7 °F (36 5 °C)   Resp 18   Wt 82 6 kg (182 lb)   SpO2 93%   BMI 33 29 kg/m²   No LMP recorded  Patient has had a hysterectomy  Physical Exam     Physical Exam  Vitals and nursing note reviewed  Constitutional:       General: She is in acute distress  Appearance: Normal appearance  She is well-developed and normal weight  She is not ill-appearing, toxic-appearing or diaphoretic  HENT:      Head: Normocephalic and atraumatic  Nose:      Comments: Mildly inflamed nasal mucosa     Mouth/Throat:      Mouth: Mucous membranes are moist       Pharynx: No posterior oropharyngeal erythema  Eyes:      General:         Right eye: No discharge  Left eye: No discharge  Conjunctiva/sclera: Conjunctivae normal    Cardiovascular:      Rate and Rhythm: Normal rate and regular rhythm  Pulmonary:      Effort: Pulmonary effort is normal  No respiratory distress  Breath sounds: Normal breath sounds  No wheezing, rhonchi or rales  Abdominal:      General: Abdomen is flat  Skin:     General: Skin is warm  Findings: No erythema  Neurological:      General: No focal deficit present  Mental Status: She is alert and oriented to person, place, and time  Psychiatric:         Mood and Affect: Mood normal          Behavior: Behavior normal          Thought Content:  Thought content normal          Judgment: Judgment normal

## 2023-07-03 ENCOUNTER — OFFICE VISIT (OUTPATIENT)
Dept: INTERNAL MEDICINE CLINIC | Facility: CLINIC | Age: 58
End: 2023-07-03
Payer: COMMERCIAL

## 2023-07-03 VITALS
TEMPERATURE: 98 F | RESPIRATION RATE: 16 BRPM | HEART RATE: 71 BPM | OXYGEN SATURATION: 96 % | WEIGHT: 186 LBS | HEIGHT: 62 IN | DIASTOLIC BLOOD PRESSURE: 80 MMHG | BODY MASS INDEX: 34.23 KG/M2 | SYSTOLIC BLOOD PRESSURE: 132 MMHG

## 2023-07-03 DIAGNOSIS — D64.9 ANEMIA, UNSPECIFIED TYPE: ICD-10-CM

## 2023-07-03 DIAGNOSIS — F41.9 ANXIETY AND DEPRESSION: ICD-10-CM

## 2023-07-03 DIAGNOSIS — R73.03 PREDIABETES: Primary | ICD-10-CM

## 2023-07-03 DIAGNOSIS — R79.89 ABNORMAL LFTS: ICD-10-CM

## 2023-07-03 DIAGNOSIS — Z12.11 COLON CANCER SCREENING: ICD-10-CM

## 2023-07-03 DIAGNOSIS — F32.A ANXIETY AND DEPRESSION: ICD-10-CM

## 2023-07-03 DIAGNOSIS — E78.2 MIXED HYPERLIPIDEMIA: ICD-10-CM

## 2023-07-03 DIAGNOSIS — M72.2 PLANTAR FASCIITIS: ICD-10-CM

## 2023-07-03 PROCEDURE — 99213 OFFICE O/P EST LOW 20 MIN: CPT | Performed by: INTERNAL MEDICINE

## 2023-07-03 RX ORDER — ALPRAZOLAM 0.5 MG/1
0.5 TABLET ORAL
Qty: 20 TABLET | Refills: 0 | Status: SHIPPED | OUTPATIENT
Start: 2023-07-03

## 2023-07-03 NOTE — ASSESSMENT & PLAN NOTE
Having intermittent anxiety panic attack patient stopped taking Lexapro more than 6 months ago last 1 month been getting more anxious with panic attacks restarted Zoloft 50 mg and given Xanax 0.5 once a day as needed for anxiety attack

## 2023-07-03 NOTE — PATIENT INSTRUCTIONS
Anxiety   WHAT YOU NEED TO KNOW:   Anxiety is a condition that causes you to feel extremely worried or nervous. The feelings are so strong that they can cause problems with your daily activities or sleep. Anxiety may be triggered by something you fear, or it may happen without a cause. Family or work stress, smoking, caffeine, and alcohol can increase your risk for anxiety. Certain medicines or health conditions can also increase your risk. Anxiety can become a long-term condition if it is not managed or treated. DISCHARGE INSTRUCTIONS:   Call your local emergency number (911 in the [address]) if:   You have chest pain, tightness, or heaviness that may spread to your shoulders, arms, jaw, neck, or back. You feel like hurting yourself or someone else. Call your doctor if:   Your symptoms get worse or do not get better with treatment. Your anxiety keeps you from doing your regular daily activities. You have new symptoms since your last visit. You have questions or concerns about your condition or care. Medicines:   Medicines  may be given to help you feel more calm and relaxed, and decrease your symptoms. Take your medicine as directed. Contact your healthcare provider if you think your medicine is not helping or if you have side effects. Tell your provider if you are allergic to any medicine. Keep a list of the medicines, vitamins, and herbs you take. Include the amounts, and when and why you take them. Bring the list or the pill bottles to follow-up visits. Carry your medicine list with you in case of an emergency. Manage anxiety:   Talk to someone about your anxiety. Your healthcare provider may suggest counseling. Cognitive behavioral therapy can help you understand and change how you react to events that trigger your symptoms. You might feel more comfortable talking with a friend or family member about your anxiety. Choose someone you know will be supportive and encouraging.     Find ways to relax. Activities such as exercise, meditation, or listening to music can help you relax. Spend time with friends, or do things you enjoy. Practice deep breathing. Deep breathing can help you relax when you feel anxious. Focus on taking slow, deep breaths several times a day, or during an anxiety attack. Breathe in through your nose and out through your mouth. Create a regular sleep routine. Regular sleep can help you feel calmer during the day. Go to sleep and wake up at the same times every day. Do not watch television or use the computer right before bed. Your room should be comfortable, dark, and quiet. Eat a variety of healthy foods. Healthy foods include fruits, vegetables, low-fat dairy products, lean meats, fish, whole-grain breads, and cooked beans. Healthy foods can help you feel less anxious and have more energy. Exercise regularly. Exercise can increase your energy level. Exercise may also lift your mood and help you sleep better. Your healthcare provider can help you create an exercise plan. Do not smoke. Nicotine and other chemicals in cigarettes and cigars can increase anxiety. Ask your healthcare provider for information if you currently smoke and need help to quit. E-cigarettes or smokeless tobacco still contain nicotine. Talk to your healthcare provider before you use these products. Do not have caffeine. Caffeine can make your symptoms worse. Do not have foods or drinks that are meant to increase your energy level. Limit or do not drink alcohol. Ask your healthcare provider if alcohol is safe for you. You may not be able to drink alcohol if you take certain anxiety or depression medicines. Limit alcohol to 1 drink per day if you are a woman. Limit alcohol to 2 drinks per day if you are a man. A drink of alcohol is 12 ounces of beer, 5 ounces of wine, or 1½ ounces of liquor. Do not use drugs. Drugs can make your anxiety worse.  It can also make anxiety hard to manage. Talk to your healthcare provider if you use drugs and want help to quit. Follow up with your doctor within 2 weeks or as directed:  Write down your questions so you remember to ask them during your visits. © Copyright [employer] 2022 Information is for End User's use only and may not be sold, redistributed or otherwise used for commercial purposes. The above information is an  only. It is not intended as medical advice for individual conditions or treatments. Talk to your doctor, nurse or pharmacist before following any medical regimen to see if it is safe and effective for you.

## 2023-07-03 NOTE — PROGRESS NOTES
BMI Counseling: Body mass index is 34.02 kg/m². The BMI is above normal. Nutrition recommendations include decreasing portion sizes, encouraging healthy choices of fruits and vegetables, decreasing fast food intake, consuming healthier snacks, limiting drinks that contain sugar, moderation in carbohydrate intake, increasing intake of lean protein, reducing intake of saturated and trans fat and reducing intake of cholesterol. Exercise recommendations include moderate physical activity 150 minutes/week and exercising 3-5 times per week. No pharmacotherapy was ordered. Rationale for BMI follow-up plan is due to patient being overweight or obese. Tobacco Cessation Counseling: Tobacco cessation counseling was provided. The patient is sincerely urged to quit consumption of tobacco. She is not ready to quit tobacco.       Dr. Indira Gallardo Office Visit Note  23     Leana Jimenez 66 y.o. female MRN: [id number]  : 1965    Assessment:     1. Prediabetes  Assessment & Plan:  Last A1c 6.0 borderline advised diabetic diet will monitor closely      2. Colon cancer screening  -     Cologuard    3. Anxiety and depression  Assessment & Plan:  Having intermittent anxiety panic attack patient stopped taking Lexapro more than 6 months ago last 1 month been getting more anxious with panic attacks restarted Zoloft 50 mg and given Xanax 0.5 once a day as needed for anxiety attack    Orders:  -     ALPRAZolam (XANAX) 0.5 mg tablet; Take 1 tablet (0.5 mg total) by mouth daily at bedtime as needed for anxiety  -     sertraline (ZOLOFT) 50 mg tablet; Take 1 tablet (50 mg total) by mouth daily    4. Plantar fasciitis  Assessment & Plan:  3 continue stretching exercises to use over-the-counter ibuprofen seems to be helping      5. Abnormal LFTs  Assessment & Plan:  Asymptomatic we will repeat LFT      6. Anemia, unspecified type  Assessment & Plan:  Last hemoglobin hematocrit look stable we will repeat CBC after 3 months      7. Mixed hyperlipidemia  Assessment & Plan:  Last LDL elevated 143 recommended statin patient reluctant low-fat low-cholesterol diet explained the risk of not taking statin            Discussion Summary and Plan: Today's care plan and medications were reviewed with patient in detail and all their questions answered to their satisfaction. Chief Complaint   Patient presents with   • Follow-up     Stopped taking everything. Lump on her right knee noticed it six weeks ago. Subjective:  Came in follow-up chronic medical condition listed under visit diagnosis patient was on Zoloft for general anxiety panic attack stopped taking Zoloft on her own a few months ago she has done feeling more anxious and having panic attacks and also complains of pain both feet mainly plantar aspect for last 1 month off-and-on denies any chest pain no difficulty breathing no nausea vomiting no headache      The following portions of the patient's history were reviewed and updated as appropriate: allergies, current medications, past family history, past medical history, past social history, past surgical history and problem list.    Review of Systems   Constitutional: Positive for fatigue. Negative for activity change, appetite change, chills, diaphoresis, fever and unexpected weight change. HENT: Negative for congestion, dental problem, drooling, ear discharge, ear pain, facial swelling, hearing loss, mouth sores, nosebleeds, postnasal drip, rhinorrhea, sinus pressure, sneezing, sore throat, tinnitus, trouble swallowing and voice change. Eyes: Negative for photophobia, pain, discharge, redness, itching and visual disturbance. Respiratory: Negative for apnea, cough, choking, chest tightness, shortness of breath, wheezing and stridor. Cardiovascular: Negative for chest pain, palpitations and leg swelling.    Gastrointestinal: Negative for abdominal distention, abdominal pain, anal bleeding, blood in stool, constipation, diarrhea, nausea, rectal pain and vomiting. Endocrine: Negative for cold intolerance, heat intolerance, polydipsia, polyphagia and polyuria. Genitourinary: Negative for decreased urine volume, difficulty urinating, dysuria, enuresis, flank pain, frequency, genital sores, hematuria and urgency. Musculoskeletal: Positive for arthralgias. Negative for myalgias, neck pain and neck stiffness. Skin: Negative for color change, pallor, rash and wound. Allergic/Immunologic: Negative. Negative for environmental allergies, food allergies and immunocompromised state. Neurological: Negative for dizziness, tremors, seizures, syncope, facial asymmetry, speech difficulty, weakness, light-headedness, numbness and headaches. Psychiatric/Behavioral: Negative for agitation, behavioral problems, confusion, decreased concentration, dysphoric mood, hallucinations, self-injury, sleep disturbance and suicidal ideas. The patient is nervous/anxious. The patient is not hyperactive. Historical Information   Patient Active Problem List   Diagnosis   • Atypical chest pain   • Plantar fasciitis   • Pain in both feet   • Congenital pes planus of right foot   • Congenital pes planus of left foot   • Tinea pedis of both feet   • Disorder of right eustachian tube   • Obesity (BMI 30-39. 9)   • Abnormal LFTs   • Anemia   • Hyperglycemia   • Primary osteoarthritis involving multiple joints   • Prediabetes   • Mixed hyperlipidemia   • Anxiety and depression     Past Medical History:   Diagnosis Date   • Anxiety    • Psychiatric disorder     anxiety     Past Surgical History:   Procedure Laterality Date   •  SECTION     • HYSTERECTOMY     • MAMMO (HISTORICAL)  2022   • TONSILLECTOMY       Social History     Substance and Sexual Activity   Alcohol Use No     Social History     Substance and Sexual Activity   Drug Use No     Social History     Tobacco Use   Smoking Status Light Smoker   • Types: E-Cigarettes   Smokeless Tobacco Never   Tobacco Comments    vape     Family History   Problem Relation Age of Onset   • Heart disease Mother    • Heart attack Mother    • Diabetes Mother    • Diabetes Father    • Heart disease Father    • Atrial fibrillation Father    • Snyder's esophagus Sister    • Diabetes Maternal Grandmother    • Diabetes Maternal Grandfather      Health Maintenance Due   Topic   • Hepatitis C Screening    • Pneumococcal Vaccine: Pediatrics (0 to 5 Years) and At-Risk Patients (6 to 59 Years) (1 - PCV)   • HIV Screening    • Annual Physical    • DTaP,Tdap,and Td Vaccines (1 - Tdap)   • Cervical Cancer Screening    • Colorectal Cancer Screening    • Osteoporosis Screening    • Breast Cancer Screening: Mammogram    • Influenza Vaccine (1)      Meds/Allergies       Current Outpatient Medications:   •  ALPRAZolam (XANAX) 0.5 mg tablet, Take 1 tablet (0.5 mg total) by mouth daily at bedtime as needed for anxiety, Disp: 20 tablet, Rfl: 0  •  sertraline (ZOLOFT) 50 mg tablet, Take 1 tablet (50 mg total) by mouth daily, Disp: 30 tablet, Rfl: 2  •  escitalopram (LEXAPRO) 10 mg tablet, , Disp: , Rfl: 2  •  gabapentin (NEURONTIN) 100 mg capsule, Take 1 capsule (100 mg total) by mouth 3 (three) times a day (Patient not taking: Reported on 7/3/2023), Disp: 90 capsule, Rfl: 0  •  Phentermine-Topiramate (Qsymia) 7.5-46 MG CP24, Take 1 tablet by mouth daily (Patient not taking: Reported on 7/3/2023), Disp: 30 capsule, Rfl: 0      Objective:    Vitals:   /80   Pulse 71   Temp 98 °F (36.7 °C)   Resp 16   Ht 5' 2" (1.575 m)   Wt 84.4 kg (186 lb)   SpO2 96%   BMI 34.02 kg/m²   Body mass index is 34.02 kg/m². Vitals:    07/03/23 1523   Weight: 84.4 kg (186 lb)       Physical Exam  Vitals and nursing note reviewed. Constitutional:       General: She is not in acute distress. Appearance: She is well-developed. She is obese. She is not ill-appearing, toxic-appearing or diaphoretic. HENT:      Head: Normocephalic and atraumatic. Right Ear: External ear normal.      Left Ear: External ear normal.      Nose: Nose normal.      Mouth/Throat:      Pharynx: No oropharyngeal exudate. Eyes:      General: Lids are normal. Lids are everted, no foreign bodies appreciated. No scleral icterus. Right eye: No discharge. Left eye: No discharge. Conjunctiva/sclera: Conjunctivae normal.      Pupils: Pupils are equal, round, and reactive to light. Neck:      Thyroid: No thyromegaly. Vascular: Normal carotid pulses. No carotid bruit, hepatojugular reflux or JVD. Trachea: No tracheal tenderness or tracheal deviation. Cardiovascular:      Rate and Rhythm: Normal rate and regular rhythm. Pulses: Normal pulses. Heart sounds: Normal heart sounds. No murmur heard. No friction rub. No gallop. Pulmonary:      Effort: Pulmonary effort is normal. No respiratory distress. Breath sounds: Normal breath sounds. No stridor. No wheezing or rales. Chest:      Chest wall: No tenderness. Abdominal:      General: Bowel sounds are normal. There is no distension. Palpations: Abdomen is soft. There is no mass. Tenderness: There is no abdominal tenderness. There is no guarding or rebound. Musculoskeletal:         General: No tenderness or deformity. Normal range of motion. Cervical back: Normal range of motion and neck supple. No edema, erythema or rigidity. No spinous process tenderness or muscular tenderness. Normal range of motion. Lymphadenopathy:      Head:      Right side of head: No submental, submandibular, tonsillar, preauricular or posterior auricular adenopathy. Left side of head: No submental, submandibular, tonsillar, preauricular, posterior auricular or occipital adenopathy. Cervical: No cervical adenopathy. Right cervical: No superficial, deep or posterior cervical adenopathy. Left cervical: No superficial, deep or posterior cervical adenopathy.       Upper Body: Right upper body: No pectoral adenopathy. Left upper body: No pectoral adenopathy. Skin:     General: Skin is warm and dry. Coloration: Skin is not pale. Findings: No erythema or rash. Neurological:      General: No focal deficit present. Mental Status: She is alert and oriented to person, place, and time. Cranial Nerves: No cranial nerve deficit. Sensory: No sensory deficit. Motor: No tremor, abnormal muscle tone or seizure activity. Coordination: Coordination normal.      Gait: Gait normal.      Deep Tendon Reflexes: Reflexes are normal and symmetric. Reflexes normal.   Psychiatric:         Behavior: Behavior normal.         Thought Content: Thought content normal.         Judgment: Judgment normal.         Lab Review   No visits with results within 2 Month(s) from this visit. Latest known visit with results is:   Orders Only on 10/07/2022   Component Date Value Ref Range Status   • Hemoglobin A1C 10/07/2022 6.0   Final         Patient Instructions   Anxiety   WHAT YOU NEED TO KNOW:   Anxiety is a condition that causes you to feel extremely worried or nervous. The feelings are so strong that they can cause problems with your daily activities or sleep. Anxiety may be triggered by something you fear, or it may happen without a cause. Family or work stress, smoking, caffeine, and alcohol can increase your risk for anxiety. Certain medicines or health conditions can also increase your risk. Anxiety can become a long-term condition if it is not managed or treated. DISCHARGE INSTRUCTIONS:   Call your local emergency number (911 in the [address]) if:   You have chest pain, tightness, or heaviness that may spread to your shoulders, arms, jaw, neck, or back. You feel like hurting yourself or someone else. Call your doctor if:   Your symptoms get worse or do not get better with treatment. Your anxiety keeps you from doing your regular daily activities.     You have new symptoms since your last visit. You have questions or concerns about your condition or care. Medicines:   Medicines  may be given to help you feel more calm and relaxed, and decrease your symptoms. Take your medicine as directed. Contact your healthcare provider if you think your medicine is not helping or if you have side effects. Tell your provider if you are allergic to any medicine. Keep a list of the medicines, vitamins, and herbs you take. Include the amounts, and when and why you take them. Bring the list or the pill bottles to follow-up visits. Carry your medicine list with you in case of an emergency. Manage anxiety:   Talk to someone about your anxiety. Your healthcare provider may suggest counseling. Cognitive behavioral therapy can help you understand and change how you react to events that trigger your symptoms. You might feel more comfortable talking with a friend or family member about your anxiety. Choose someone you know will be supportive and encouraging. Find ways to relax. Activities such as exercise, meditation, or listening to music can help you relax. Spend time with friends, or do things you enjoy. Practice deep breathing. Deep breathing can help you relax when you feel anxious. Focus on taking slow, deep breaths several times a day, or during an anxiety attack. Breathe in through your nose and out through your mouth. Create a regular sleep routine. Regular sleep can help you feel calmer during the day. Go to sleep and wake up at the same times every day. Do not watch television or use the computer right before bed. Your room should be comfortable, dark, and quiet. Eat a variety of healthy foods. Healthy foods include fruits, vegetables, low-fat dairy products, lean meats, fish, whole-grain breads, and cooked beans. Healthy foods can help you feel less anxious and have more energy. Exercise regularly. Exercise can increase your energy level.  Exercise may also lift your mood and help you sleep better. Your healthcare provider can help you create an exercise plan. Do not smoke. Nicotine and other chemicals in cigarettes and cigars can increase anxiety. Ask your healthcare provider for information if you currently smoke and need help to quit. E-cigarettes or smokeless tobacco still contain nicotine. Talk to your healthcare provider before you use these products. Do not have caffeine. Caffeine can make your symptoms worse. Do not have foods or drinks that are meant to increase your energy level. Limit or do not drink alcohol. Ask your healthcare provider if alcohol is safe for you. You may not be able to drink alcohol if you take certain anxiety or depression medicines. Limit alcohol to 1 drink per day if you are a woman. Limit alcohol to 2 drinks per day if you are a man. A drink of alcohol is 12 ounces of beer, 5 ounces of wine, or 1½ ounces of liquor. Do not use drugs. Drugs can make your anxiety worse. It can also make anxiety hard to manage. Talk to your healthcare provider if you use drugs and want help to quit. Follow up with your doctor within 2 weeks or as directed:  Write down your questions so you remember to ask them during your visits. © Copyright [employer] 2022 Information is for End User's use only and may not be sold, redistributed or otherwise used for commercial purposes. The above information is an  only. It is not intended as medical advice for individual conditions or treatments. Talk to your doctor, nurse or pharmacist before following any medical regimen to see if it is safe and effective for you. Sonam Teixeira MD        "This note has been constructed using a voice recognition system. Therefore there may be syntax, spelling, and/or grammatical errors.  Please call if you have any questions. "

## 2023-07-03 NOTE — ASSESSMENT & PLAN NOTE
Last LDL elevated 143 recommended statin patient reluctant low-fat low-cholesterol diet explained the risk of not taking statin

## 2023-07-07 DIAGNOSIS — F41.9 ANXIETY AND DEPRESSION: Primary | ICD-10-CM

## 2023-07-07 DIAGNOSIS — F32.A ANXIETY AND DEPRESSION: Primary | ICD-10-CM

## 2023-07-08 RX ORDER — ESCITALOPRAM OXALATE 10 MG/1
10 TABLET ORAL
Qty: 30 TABLET | Refills: 1 | Status: SHIPPED | OUTPATIENT
Start: 2023-07-08

## 2023-07-19 LAB — COLOGUARD RESULT REPORTABLE: NORMAL

## 2023-07-28 DIAGNOSIS — Z12.11 SCREENING FOR COLON CANCER: Primary | ICD-10-CM

## 2023-08-07 LAB — COLOGUARD RESULT REPORTABLE: POSITIVE

## 2023-08-10 ENCOUNTER — TELEPHONE (OUTPATIENT)
Dept: INTERNAL MEDICINE CLINIC | Facility: CLINIC | Age: 58
End: 2023-08-10

## 2023-08-10 NOTE — TELEPHONE ENCOUNTER
Called patient left message for her to call our office to speak with Dr. Porsha Burkett regarding her colo guard test.

## 2023-08-23 ENCOUNTER — TELEMEDICINE (OUTPATIENT)
Dept: INTERNAL MEDICINE CLINIC | Facility: CLINIC | Age: 58
End: 2023-08-23
Payer: COMMERCIAL

## 2023-08-23 VITALS — WEIGHT: 186 LBS | HEIGHT: 62 IN | BODY MASS INDEX: 34.23 KG/M2

## 2023-08-23 DIAGNOSIS — U07.1 COVID-19: Primary | ICD-10-CM

## 2023-08-23 DIAGNOSIS — B00.1 COLD SORE: ICD-10-CM

## 2023-08-23 DIAGNOSIS — R19.5 POSITIVE COLORECTAL CANCER SCREENING USING COLOGUARD TEST: ICD-10-CM

## 2023-08-23 PROCEDURE — 99213 OFFICE O/P EST LOW 20 MIN: CPT | Performed by: INTERNAL MEDICINE

## 2023-08-23 RX ORDER — NIRMATRELVIR AND RITONAVIR 300-100 MG
3 KIT ORAL 2 TIMES DAILY
Qty: 30 TABLET | Refills: 0 | Status: SHIPPED | OUTPATIENT
Start: 2023-08-23 | End: 2023-08-28

## 2023-08-23 RX ORDER — GUAIFENESIN AND CODEINE PHOSPHATE 100; 10 MG/5ML; MG/5ML
5 SOLUTION ORAL 4 TIMES DAILY PRN
Qty: 118 ML | Refills: 0 | Status: SHIPPED | OUTPATIENT
Start: 2023-08-23

## 2023-08-23 RX ORDER — VALACYCLOVIR HYDROCHLORIDE 1 G/1
1000 TABLET, FILM COATED ORAL 2 TIMES DAILY
Qty: 2 TABLET | Refills: 0 | Status: SHIPPED | OUTPATIENT
Start: 2023-08-23 | End: 2023-08-24

## 2023-08-23 NOTE — PATIENT INSTRUCTIONS
COVID-19 and Chronic Health Conditions   AMBULATORY CARE:   What you need to know about coronavirus disease 2019 (COVID-19) and chronic health conditions: Your chronic condition may increase your risk for COVID-19 or serious problems it can cause. Healthcare providers might need to make changes that affect how you usually manage your chronic health condition. Providers may change hours of operation or not have patients come in to be seen. You may not be able to make appointments to get blood drawn or to have tests or procedures. This may continue until the virus that causes COVID-19 is controlled. Until then, you can take steps to manage your condition. The steps will also lower your risk for COVID-19 or the serious problems it causes. If you do develop COVID-19, healthcare providers will tell you when it is okay to be around others after you recover. This depends on your chronic condition, any symptoms of COVID-19 that developed, and how severe the symptoms were. What you need to know about serious problems from the virus:  You may develop long-term health problems caused by the virus. Your risk is higher if you are 65 or older. A weak immune system, obesity, diabetes, chronic kidney disease, or a heart or lung condition can also increase your risk. Your risk is also higher if you are a current or former cigarette smoker.  COVID-19 can lead to any of the following:  Multisymptom inflammatory syndrome in adults (MIS-A) or in children (MIS-C), causing inflammation in the heart, digestive system, skin, or brain    Shortness of breath, serious lower respiratory conditions, such as pneumonia or acute respiratory distress syndrome (ARDS)    Blood clots or blood vessel damage    Organ damage from a lack of oxygen or from blood clots    Sleep problems    Problems thinking clearly, remembering information, or concentrating    Mood changes, depression, or anxiety    Long-term problems tasting or smelling    Loss of appetite and weight loss    Nerve pain    Fatigue (feeling mentally and physically tired)    Call your local emergency number (911 in the [address]) if:   You have trouble breathing or shortness of breath. You have chest pain or pressure that lasts longer than 5 minutes. You become confused or hard to wake. Your lips or face are blue. Seek care immediately if:   You have a fever of 104°F (40°C) or higher. Call your doctor or healthcare provider if:   You have symptoms of COVID-19. You have questions or concerns about COVID-19 or your chronic condition. How the 2019 coronavirus spreads: The virus spreads quickly and easily. The virus can be passed starting 2 to 3 days before symptoms begin or before a positive test if symptoms never begin. Droplets are the main way all coronaviruses spread. The virus travels in droplets that form when a person talks, sings, coughs, or sneezes. The droplets can also float in the air for minutes or hours. Infection happens when you breathe in the droplets or get them in your eyes or nose. Close personal contact with an infected person increases your risk for infection. This means being within 6 feet (2 meters) of the person for at least 15 minutes over 24 hours. Person-to-person contact can spread the virus. For example, a person with the virus on his or her hands can spread it by shaking hands with someone. The virus can stay on objects and surfaces for up to 3 days. You may become infected by touching the object or surface and then touching your eyes or mouth. What you need to know about the signs and symptoms of COVID-19:  Signs and symptoms usually start about 5 days after infection but can take 2 to 14 days. Signs and symptoms range from mild to severe. You may feel like you have the flu or a bad cold. Your chronic health condition may cause some of the same symptoms COVID-19 causes.  This can make it hard to know if a symptom is from COVID-19 or your chronic condition. Keep a record of any new or worsening symptom you have. This is especially important if you have a condition that often causes shortness of breath. Your provider can tell you if you should be tested for COVID-19. Tell your healthcare provider if you think you were infected but develop signs or symptoms not listed below:  A cough    Shortness of breath or trouble breathing that may become severe    A fever of at least 100.4°F, or 38°C (may be lower in adults 65 or older)    Chills that might include shaking    Muscle pain, body aches, or a headache    A sore throat    Suddenly not being able to taste or smell anything    Feeling very tired (fatigue)    Congestion (stuffy head and nose), or a runny nose    Diarrhea, nausea, or vomiting    Manage your chronic health condition during this time:  If you do not have a regular healthcare provider, experts recommend you contact a local [facility] center or health department. The following can help you manage your condition and prevent COVID-19:  Get emergency care for your condition if needed. Talk to your healthcare providers about symptoms of your chronic condition that need immediate care. Your providers can help you create a plan or add exacerbation management to your plan. The plan will include when to go to an emergency department and when to call your local emergency number. This will depend on where you live and the services that are available during this time. Go to dialysis appointments as scheduled. It is important to stay on schedule. You will need to have enough food to be able to follow the emergency diet plan if you must miss a session. The emergency diet needs to be part of the management plan for your condition. Reschedule any upcoming appointments as needed. Medical facilities may be closed until the coronavirus is better controlled. This means you may need to reschedule a surgery, procedure, or check-up appointment.  If you cannot have a phone or video appointment, you will need to make a new appointment. Some providers may be scheduling appointments several months in advance. Some surgeries and procedures will happen as scheduled. This depends on the medical condition and the reason for the surgery or procedure. You may need to have extra testing for COVID-19 several days before. Follow any regular management plan you use. Your healthcare provider will tell you if you need to make any changes to your regular management plan. For example, if you have asthma, continue to follow your asthma action plan. If you have diabetes, you may need to check your blood sugar level more often. Stress and illness can make blood sugar levels go up. You may need to adjust medicine such as insulin. If you have a heart condition or high blood pressure, you may need to check your blood pressure more often. Stress and illness can also raise your blood pressure. Talk to your healthcare providers about your medicines. You may be able to get more than 1 month of medicine at a time. This will lower the number of times you need to go to a pharmacy to get your medicines. Make sure you have enough medicine if you have a condition that can lead to an emergency. Examples include asthma medicines, insulin, or an epinephrine pen. Check the expiration dates on the medicines you currently have. Ask for refills as soon as possible, if needed. If it is not time to refill prescriptions, you may be able to get an emergency supply of some medicines. Medicine plans vary, so ask your healthcare provider or pharmacist for options. Have supplies available in your home. If possible, get extra supplies you use regularly. Examples include absorbent pads, syringes, and wound cleaning solutions. This will limit the number of trips out of your home to get supplies.     What you can do to prevent having to go out of your home during this time:   Ask your healthcare provider for other ways to have appointments. Some providers offer phone, video, or other types of appointments. Have food, medicines, and other supplies delivered. Some pharmacies can send certain medicines to you through the mail. Grocery stores and restaurants may be able to deliver food and other items. If possible, have delivered items placed somewhere. Try not to have someone hand you an item. You will be so close to the person that the virus can spread between you. Ask someone to get items you need. The person can get groceries, medicines, or other needed items for you. Choose a person who does not have signs or symptoms of COVID-19 or has tested negative for it. The person should not be waiting for test results. He or she should not have a condition that increases the risk for COVID-19 or serious problems it causes. What you need to know about COVID-19 vaccines: Your healthcare provider can give you more information about what to expect, depending on your chronic condition. You are considered fully vaccinated against COVID-19 two weeks after the final dose of any COVID-19 vaccine. Let your healthcare provider know when you have received the final dose of the vaccine. Make a copy of your vaccination card. Keep the original with you in case you need to show it. Keep the copy in a safe place. Get a COVID-19 vaccine as directed. Vaccination is recommended for everyone 6 months or older. COVID-19 vaccines are given as a shot in 1 to 3 doses as a primary series. This depends on the vaccine brand and the age of the person who receives it. A booster dose is recommended for everyone 5 years or older after the primary series is complete. A second booster  is recommended for all adults 48 or older and for immunocompromised adolescents. The second booster is also recommended for anyone who got the 1-dose brand of vaccine for the first dose and a booster.  Your provider can give you more information on boosters and help you schedule all needed doses. Continue social distancing and other measures. You can become infected even after you get the vaccine. You may also be able to pass the virus to others without knowing you are infected. After you get the vaccine, check local, national, and international travel rules. You may need to be tested before you travel. Some countries require proof of a negative test before you travel. You may also need to quarantine after you return. Medicine may be given to prevent infection. The medicine can be given if you are at high risk for infection and cannot get the vaccine. It can also be given if your immune system does not respond well to the vaccine. Lower your risk for COVID-19:   Wash your hands often throughout the day. Use soap and water. Rub your soapy hands together, lacing your fingers, for at least 20 seconds. Rinse with warm, running water. Dry your hands with a clean towel or paper towel. Use hand  that contains alcohol if soap and water are not available. Teach children how to wash their hands and use hand . Cover sneezes and coughs. Turn your face away and cover your mouth and nose with a tissue. Throw the tissue away. Use the bend of your arm if a tissue is not available. Then wash your hands with soap and water or use hand . Teach children how to cover a cough or sneeze. Follow worldwide, national, and local social distancing guidelines. Keep at least 6 feet (2 meters) between you and others. Wear a face covering (mask) around anyone who does not live in your home. Use a cloth covering with at least 2 layers. You can also create layers by putting a cloth covering over a disposable non-medical mask. Cover your mouth and your nose. Try not to touch your face. If you get the virus on your hands, you can transfer it to your eyes, nose, or mouth and become infected.  You can also transfer it to objects, surfaces, or people. Clean and disinfect high-touch surfaces and objects in your home often. Use disinfecting wipes, or make a solution by mixing 4 teaspoons of bleach with 1 quart (4 cups) of water. Do not  use any cleaning or disinfecting products that can trigger an asthma attack or other breathing problems. Open windows or have circulating air as you clean. Do not  mix ammonia with bleach. This will create toxic fumes. How to follow social distancing guidelines:  National and local social distancing rules vary. Rules and restrictions may change over time as restrictions are lifted. The following are general guidelines:  Stay home if you are sick or think you may have COVID-19. It is important to stay home if you are waiting for a testing appointment or for test results. Avoid close physical contact with anyone who does not live in your home. Do not shake hands with, hug, or kiss a person as a greeting. If you must use public transportation (such as a bus or subway), try to sit or stand away from others. Wear your face covering. Avoid in-person gatherings and crowds. Attend virtually if possible. Help strengthen your immune system:   Ask about other vaccines you may need. Get the influenza (flu) vaccine as soon as recommended each year, usually starting in September or October. Get the pneumonia vaccine if recommended. Your healthcare provider can tell you if you should also get other vaccines, and when to get them. Do not smoke. Nicotine and other chemicals in cigarettes and cigars can increase your risk for infection and for serious COVID-19 effects. Ask your healthcare provider for information if you currently smoke and need help to quit. E-cigarettes or smokeless tobacco still contain nicotine. Talk to your healthcare provider before you use these products. Eat a variety of healthy foods.   Examples include vegetables, fruits, whole-grain breads and cereals, lean meats and poultry, fish, low-fat dairy products, and cooked beans. Healthy foods contain nutrients that help keep your immune system strong. Find ways to manage stress. You may be feeling more stressed than usual because of the COVID-19 outbreak. The situation is very stressful to many people. Talk to your healthcare providers about ways to manage stress during this time. Stress can lead to breathing problems or trigger an attack or exacerbation of many health conditions. Pick 1 or 2 times a day to watch the news. Constant news watching can increase your stress levels. Instead, do things you enjoy. Talk to a friend or go for a walk together. Read a book or magazine. Take a warm bath or listen to soothing music. Follow up with your doctor or healthcare provider as directed: Your providers will tell you when you can come in for tests, procedures, or check-ups. Bring your symptom record with you to all appointments. Write down your questions so you remember to ask them during your visits. For more information:   Centers for Disease Control and Prevention  [facility]  [place]  Phone: 0- [phone] - 9885282  Phone: 6- 643 - 8594297  Web Address: [employer]    © Copyright [facility] 2022 Information is for End User's use only and may not be sold, redistributed or otherwise used for commercial purposes. The above information is an  only. It is not intended as medical advice for individual conditions or treatments. Talk to your doctor, nurse or pharmacist before following any medical regimen to see if it is safe and effective for you.

## 2023-08-23 NOTE — ASSESSMENT & PLAN NOTE
Patient today symptoms of low-grade fever cough and yellowish sputum nasal congestion runny nose headache dizziness aches pains tested positive this morning COVID-19 had all the vaccines recommended to take vitamin C vitamin D zinc and prescribed Paxlovid explained the side effects with some cough medicine with codeine and advised to take all the precautions as listed under instruction

## 2023-08-23 NOTE — LETTER
August 23, 2023     Patient: Moody Dye  YOB: 1965  Date of Visit: 8/23/2023      To Whom it May Concern: Moody Dye is under my professional care. Go Peraza was seen in my office on 8/23/2023. Go Peraza is advised to return to work on August 30, 2023    If you have any questions or concerns, please don't hesitate to call.          Sincerely,          Lam Crocker MD        CC: No Recipients

## 2023-08-23 NOTE — PROGRESS NOTES
COVID-19 Outpatient Progress Note    Assessment/Plan:    Problem List Items Addressed This Visit        Digestive    Cold sore    Relevant Medications    nirmatrelvir & ritonavir (Paxlovid, 300/100,) tablet therapy pack    valACYclovir (VALTREX) 1,000 mg tablet       Other    COVID-19 - Primary     Patient today symptoms of low-grade fever cough and yellowish sputum nasal congestion runny nose headache dizziness aches pains tested positive this morning COVID-19 had all the vaccines recommended to take vitamin C vitamin D zinc and prescribed Paxlovid explained the side effects with some cough medicine with codeine and advised to take all the precautions as listed under instruction         Relevant Medications    nirmatrelvir & ritonavir (Paxlovid, 300/100,) tablet therapy pack    valACYclovir (VALTREX) 1,000 mg tablet    guaifenesin-codeine (GUAIFENESIN AC) 100-10 MG/5ML liquid   Other Visit Diagnoses     Positive colorectal cancer screening using Cologuard test        Relevant Orders    Ambulatory referral to Gastroenterology         Disposition:     Discussed symptom directed medication options with patient. Discussed vitamin D, vitamin C, and/or zinc supplementation with patient. I have spent a total time of 15 minutes on the day of the encounter for this patient including discussing diagnostic results, discussing prognosis, risk factor reductions and impressions. Encounter provider: Brandy Sadler MD     Provider located at: [address] [facility]  [address] [facility]  [address] [facility] [address] [address] [facility]     Recent Visits  No visits were found meeting these conditions.   Showing recent visits within past 7 days and meeting all other requirements  Today's Visits  Date Type Provider Dept   08/23/23 Telemedicine Brandy Sadler MD [facility] Internal Med   Showing today's visits and meeting all other requirements  Future Appointments  No visits were found meeting these conditions. Showing future appointments within next 150 days and meeting all other requirements       Patient agrees to participate in a virtual check in via telephone or video visit instead of presenting to the office to address urgent/immediate medical needs. Patient is aware this is a billable service. She acknowledged consent and understanding of privacy and security of the video platform. The patient has agreed to participate and understands they can discontinue the visit at any time. After connecting through [facility], the patient was identified by name and date of birth. Kalpana Ibarra was informed that this was a telemedicine visit and that the exam was being conducted confidentially over secure lines. Kalpana Ibarra acknowledged consent and understanding of privacy and security of the telemedicine visit. I informed the patient that I have reviewed her record in Epic and presented the opportunity for her to ask any questions regarding the visit today. The patient agreed to participate. Verification of patient location:  Patient is located in the following state in which I hold an active license: [place]    Subjective: Kalpana Ibarra is a 66 y.o. female who has been screened for COVID-19. Patient's symptoms include fever, chills, nasal congestion, rhinorrhea, sore throat, anosmia, cough, myalgias and headache.     - Date of symptom onset: 8/22/2023      COVID-19 vaccination status: Fully vaccinated with booster        Staying home and isolating themselves?: has not      Taking care not to share personal items?: is not      Cleaning all surfaces that are touched often?: is not      Wearing a mask when leaving room?: is not      No results found for: "Francisco Wallace", "[address] [facility]", "Located within [facility]", "[facility]", "[address] [facility]", "[address] [facility]"    Review of Systems   Constitutional: Positive for chills and fever.    HENT: Positive for congestion, rhinorrhea and sore throat. Respiratory: Positive for cough. Musculoskeletal: Positive for myalgias. Neurological: Positive for headaches. Current Outpatient Medications on File Prior to Visit   Medication Sig   • ALPRAZolam (XANAX) 0.5 mg tablet Take 1 tablet (0.5 mg total) by mouth daily at bedtime as needed for anxiety   • escitalopram (LEXAPRO) 10 mg tablet Take 1 tablet (10 mg total) by mouth daily at bedtime   • sertraline (ZOLOFT) 50 mg tablet Take 1 tablet (50 mg total) by mouth daily   • gabapentin (NEURONTIN) 100 mg capsule Take 1 capsule (100 mg total) by mouth 3 (three) times a day (Patient not taking: Reported on 7/3/2023)   • Phentermine-Topiramate (Qsymia) 7.5-46 MG CP24 Take 1 tablet by mouth daily (Patient not taking: Reported on 7/3/2023)       Objective:    Ht 5' 2" (1.575 m)   Wt 84.4 kg (186 lb)   BMI 34.02 kg/m²        Physical Exam  Constitutional:       Appearance: She is ill-appearing. Neurological:      Mental Status: She is oriented to person, place, and time.        Loren Mccauley MD

## 2023-09-07 ENCOUNTER — OFFICE VISIT (OUTPATIENT)
Dept: URGENT CARE | Facility: CLINIC | Age: 58
End: 2023-09-07
Payer: COMMERCIAL

## 2023-09-07 VITALS
DIASTOLIC BLOOD PRESSURE: 83 MMHG | SYSTOLIC BLOOD PRESSURE: 151 MMHG | TEMPERATURE: 97.2 F | RESPIRATION RATE: 18 BRPM | HEART RATE: 70 BPM | OXYGEN SATURATION: 96 %

## 2023-09-07 DIAGNOSIS — R68.89 FEELING OFF-COLOR: Primary | ICD-10-CM

## 2023-09-07 LAB
ATRIAL RATE: 57 BPM
ATRIAL RATE: 58 BPM
P AXIS: 70 DEGREES
P AXIS: 73 DEGREES
PR INTERVAL: 128 MS
PR INTERVAL: 132 MS
QRS AXIS: 21 DEGREES
QRS AXIS: 25 DEGREES
QRSD INTERVAL: 78 MS
QRSD INTERVAL: 80 MS
QT INTERVAL: 416 MS
QT INTERVAL: 422 MS
QTC INTERVAL: 404 MS
QTC INTERVAL: 414 MS
T WAVE AXIS: 46 DEGREES
T WAVE AXIS: 47 DEGREES
VENTRICULAR RATE: 57 BPM
VENTRICULAR RATE: 58 BPM

## 2023-09-07 PROCEDURE — 93010 ELECTROCARDIOGRAM REPORT: CPT | Performed by: INTERNAL MEDICINE

## 2023-09-07 PROCEDURE — 99213 OFFICE O/P EST LOW 20 MIN: CPT | Performed by: FAMILY MEDICINE

## 2023-09-07 NOTE — PROGRESS NOTES
[facility]        NAME: Autumn Elizondo is a 66 y.o. female  : 1965    MRN: [id number]  DATE: 2023  TIME: 9:49 AM    Assessment and Plan   Feeling off-color [R68.89]  1. Feeling off-color  ECG 12 lead        Minimally borderline, but otherwise unremarkable ECG. Likely experienced a mild vasovagal reaction possibly secondary to anxiety without any obvious triggers. Patient advised to optimize health factors such as remaining well-hydrated, eating healthfully and consciously avoid stress throughout today. Patient Instructions     Follow up with PCP in 3-5 days. Proceed to  ER if symptoms worsen. Chief Complaint     Chief Complaint   Patient presents with   • Pain     Patient reports this morning around 8am she started having pain on b/l sides/ribcage. Reports they do not currently hurt, but the pain came on suddenly this AM. She also had a hot flash at the same time that she states lasted for a while, no fevers. She reports she also has some nausea. States she feels, "out of sorts."  Very vague symptoms in general. She states the episode lasted 5 minutes. Had Covid last week and took Paxlovid. History of Present Illness       51-year-old female with pertinent history of hyperlipidemia and anxiety presents today with a bout of feeling off-color. This morning she had a transient episode of bilateral rib cage soreness associated with light headedness, left shoulder discomfort and nausea. Rib cage soreness has since resolved, but patient continues to feel off. Does report a lack of sleep maintenance waking up hourly. Takes Xanax 0.5 mg once nightly. Hydrates with plenty of water and drinks about 1 cup of coffee every morning. Denies any stressors this morning. Review of Systems   Review of Systems   Constitutional: Negative for chills and fever. Respiratory: Negative for cough and shortness of breath. Cardiovascular: Positive for chest pain (rib pain). Gastrointestinal: Positive for nausea. Musculoskeletal: Positive for arthralgias. Neurological: Positive for light-headedness. Negative for headaches.      Current Medications       Current Outpatient Medications:   •  ALPRAZolam (XANAX) 0.5 mg tablet, Take 1 tablet (0.5 mg total) by mouth daily at bedtime as needed for anxiety, Disp: 20 tablet, Rfl: 0  •  escitalopram (LEXAPRO) 10 mg tablet, Take 1 tablet (10 mg total) by mouth daily at bedtime, Disp: 30 tablet, Rfl: 1  •  gabapentin (NEURONTIN) 100 mg capsule, Take 1 capsule (100 mg total) by mouth 3 (three) times a day (Patient not taking: Reported on 7/3/2023), Disp: 90 capsule, Rfl: 0  •  guaifenesin-codeine (GUAIFENESIN AC) 100-10 MG/5ML liquid, Take 5 mL by mouth 4 (four) times a day as needed for cough (Patient not taking: Reported on 2023), Disp: 118 mL, Rfl: 0  •  Phentermine-Topiramate (Qsymia) 7.5-46 MG CP24, Take 1 tablet by mouth daily (Patient not taking: Reported on 7/3/2023), Disp: 30 capsule, Rfl: 0  •  sertraline (ZOLOFT) 50 mg tablet, Take 1 tablet (50 mg total) by mouth daily (Patient not taking: Reported on 2023), Disp: 30 tablet, Rfl: 2  •  valACYclovir (VALTREX) 1,000 mg tablet, Take 1 tablet (1,000 mg total) by mouth 2 (two) times a day for 2 doses, Disp: 2 tablet, Rfl: 0    Current Allergies     Allergies as of 2023   • (No Known Allergies)            The following portions of the patient's history were reviewed and updated as appropriate: allergies, current medications, past family history, past medical history, past social history, past surgical history and problem list.     Past Medical History:   Diagnosis Date   • Anxiety    • Psychiatric disorder     anxiety       Past Surgical History:   Procedure Laterality Date   •  SECTION     • HYSTERECTOMY     • MAMMO (HISTORICAL)  2022   • TONSILLECTOMY         Family History   Problem Relation Age of Onset   • Heart disease Mother    • Heart attack Mother 62 • Diabetes Mother    • Diabetes Father    • Heart disease Father    • Atrial fibrillation Father    • Snyder's esophagus Sister    • Diabetes Maternal Grandmother    • Diabetes Maternal Grandfather          Medications have been verified. Objective   /83   Pulse 70   Temp (!) 97.2 °F (36.2 °C)   Resp 18   SpO2 96%   No LMP recorded. Patient has had a hysterectomy. Physical Exam     Physical Exam  Vitals and nursing note reviewed. Constitutional:       General: She is in acute distress. Appearance: Normal appearance. She is not ill-appearing, toxic-appearing or diaphoretic. HENT:      Head: Normocephalic and atraumatic. Eyes:      General:         Right eye: No discharge. Left eye: No discharge. Conjunctiva/sclera: Conjunctivae normal.   Cardiovascular:      Rate and Rhythm: Normal rate. Pulmonary:      Effort: Pulmonary effort is normal. No respiratory distress. Breath sounds: Normal breath sounds. No wheezing, rhonchi or rales. Skin:     General: Skin is warm. Findings: No erythema. Neurological:      General: No focal deficit present. Mental Status: She is alert and oriented to person, place, and time. Psychiatric:         Mood and Affect: Mood normal.         Behavior: Behavior normal.         Thought Content:  Thought content normal.         Judgment: Judgment normal.

## 2023-09-13 DIAGNOSIS — F41.9 ANXIETY AND DEPRESSION: ICD-10-CM

## 2023-09-13 DIAGNOSIS — F32.A ANXIETY AND DEPRESSION: ICD-10-CM

## 2023-09-13 RX ORDER — ESCITALOPRAM OXALATE 10 MG/1
10 TABLET ORAL
Qty: 30 TABLET | Refills: 5 | Status: SHIPPED | OUTPATIENT
Start: 2023-09-13

## 2023-09-13 NOTE — TELEPHONE ENCOUNTER
Reason for call:   [x] Refill   [] Prior Auth  [] Other:     Office:   [x] PCP/Provider - Ortiz Finch MD / PCP  [] Speciality/Provider -     Medication: Lexapro 10 mg tablet - 1 tab po daily HS    Quantity: 30    Pharmacy: [address] [place] [address]  [phone]    Does the patient have enough for 3 days?    [] Yes   [x] No - Send as HP to POD

## 2023-09-15 ENCOUNTER — OFFICE VISIT (OUTPATIENT)
Dept: GASTROENTEROLOGY | Facility: CLINIC | Age: 58
End: 2023-09-15
Payer: COMMERCIAL

## 2023-09-15 ENCOUNTER — OFFICE VISIT (OUTPATIENT)
Dept: INTERNAL MEDICINE CLINIC | Facility: CLINIC | Age: 58
End: 2023-09-15
Payer: COMMERCIAL

## 2023-09-15 ENCOUNTER — TELEPHONE (OUTPATIENT)
Dept: GASTROENTEROLOGY | Facility: CLINIC | Age: 58
End: 2023-09-15

## 2023-09-15 VITALS
DIASTOLIC BLOOD PRESSURE: 75 MMHG | HEART RATE: 54 BPM | SYSTOLIC BLOOD PRESSURE: 132 MMHG | BODY MASS INDEX: 33.13 KG/M2 | HEIGHT: 62 IN | WEIGHT: 180 LBS

## 2023-09-15 VITALS
HEIGHT: 62 IN | WEIGHT: 180 LBS | OXYGEN SATURATION: 95 % | BODY MASS INDEX: 33.13 KG/M2 | HEART RATE: 68 BPM | SYSTOLIC BLOOD PRESSURE: 142 MMHG | DIASTOLIC BLOOD PRESSURE: 90 MMHG

## 2023-09-15 DIAGNOSIS — E55.9 VITAMIN D DEFICIENCY: ICD-10-CM

## 2023-09-15 DIAGNOSIS — Z12.31 SCREENING MAMMOGRAM, ENCOUNTER FOR: ICD-10-CM

## 2023-09-15 DIAGNOSIS — D64.9 ANEMIA, UNSPECIFIED TYPE: ICD-10-CM

## 2023-09-15 DIAGNOSIS — N81.10 PROLAPSE OF ANTERIOR VAGINAL WALL: ICD-10-CM

## 2023-09-15 DIAGNOSIS — M15.9 PRIMARY OSTEOARTHRITIS INVOLVING MULTIPLE JOINTS: ICD-10-CM

## 2023-09-15 DIAGNOSIS — E78.2 MIXED HYPERLIPIDEMIA: ICD-10-CM

## 2023-09-15 DIAGNOSIS — N39.0 URINARY TRACT INFECTION WITHOUT HEMATURIA, SITE UNSPECIFIED: ICD-10-CM

## 2023-09-15 DIAGNOSIS — R79.89 ABNORMAL LFTS: ICD-10-CM

## 2023-09-15 DIAGNOSIS — N81.4 CYSTOCELE WITH PROLAPSE: Primary | ICD-10-CM

## 2023-09-15 DIAGNOSIS — R73.03 PREDIABETES: ICD-10-CM

## 2023-09-15 DIAGNOSIS — U07.1 COVID-19: ICD-10-CM

## 2023-09-15 DIAGNOSIS — R19.5 POSITIVE COLORECTAL CANCER SCREENING USING COLOGUARD TEST: ICD-10-CM

## 2023-09-15 PROCEDURE — 99203 OFFICE O/P NEW LOW 30 MIN: CPT | Performed by: PHYSICIAN ASSISTANT

## 2023-09-15 PROCEDURE — 99213 OFFICE O/P EST LOW 20 MIN: CPT | Performed by: INTERNAL MEDICINE

## 2023-09-15 NOTE — H&P (VIEW-ONLY)
[facility] - Outpatient Consultation  Jeane Veliz 66 y.o. female MRN: [id number]  Encounter: [id number]          ASSESSMENT AND PLAN:      1. Positive colorectal cancer screening using Cologuard test  Patient with positive Cologuard testing. Explained to the patient that testing looks for both abnormal DNA as well as blood and does not delineate which is which so she will need further evaluation with colonoscopy. MiraLAX prep has been ordered. Prep and procedure explained to patient. - Ambulatory referral to Gastroenterology  - Colonoscopy; Future    ______________________________________________________________________    HPI:    56-year-old female who presents today for consultation to colonoscopy as she had positive Cologuard. Patient reports that she was having occasional rectal bleeding and notes she has a history of hemorrhoids and has never had a colonoscopy. She states that she has regular bowel movements. She denies any significant upper GI symptoms. Denies any family history of any gastrointestinal malignancy. Does report mother had polyps. REVIEW OF SYSTEMS:    CONSTITUTIONAL: Denies any fever, chills, rigors, and weight loss. HEENT: No earache or tinnitus. Denies hearing loss or visual disturbances. CARDIOVASCULAR: No chest pain or palpitations. RESPIRATORY: Denies any cough, hemoptysis, shortness of breath or dyspnea on exertion. GASTROINTESTINAL: As noted in the History of Present Illness. GENITOURINARY: No problems with urination. Denies any hematuria or dysuria. NEUROLOGIC: No dizziness or vertigo, denies headaches. MUSCULOSKELETAL: Denies any muscle or joint pain. SKIN: Denies skin rashes or itching. ENDOCRINE: Denies excessive thirst. Denies intolerance to heat or cold. PSYCHOSOCIAL: Denies depression or anxiety. Denies any recent memory loss.        Historical Information   Past Medical History:   Diagnosis Date   • Anxiety    • Psychiatric disorder     anxiety     Past Surgical History:   Procedure Laterality Date   •  SECTION     • HYSTERECTOMY     • MAMMO (HISTORICAL)  2022   • TONSILLECTOMY       Social History   Social History     Substance and Sexual Activity   Alcohol Use No     Social History     Substance and Sexual Activity   Drug Use No     Social History     Tobacco Use   Smoking Status Light Smoker   • Types: E-Cigarettes   Smokeless Tobacco Never   Tobacco Comments    vape     Family History   Problem Relation Age of Onset   • Heart disease Mother    • Heart attack Mother 62   • Diabetes Mother    • Diabetes Father    • Heart disease Father    • Atrial fibrillation Father    • Snyder's esophagus Sister    • Diabetes Maternal Grandmother    • Diabetes Maternal Grandfather        Meds/Allergies       Current Outpatient Medications:   •  ALPRAZolam (XANAX) 0.5 mg tablet  •  escitalopram (LEXAPRO) 10 mg tablet  •  gabapentin (NEURONTIN) 100 mg capsule  •  guaifenesin-codeine (GUAIFENESIN AC) 100-10 MG/5ML liquid  •  Phentermine-Topiramate (Qsymia) 7.5-46 MG CP24  •  sertraline (ZOLOFT) 50 mg tablet  •  valACYclovir (VALTREX) 1,000 mg tablet    No Known Allergies        Objective     Blood pressure 132/75, pulse (!) 54, height 5' 2" (1.575 m), weight 81.6 kg (180 lb). Body mass index is 32.92 kg/m². PHYSICAL EXAM:      General Appearance:   Alert, cooperative, no distress   HEENT:   Normocephalic, atraumatic, anicteric.     Neck:  Supple, symmetrical, trachea midline   Lungs:   Clear to auscultation bilaterally; no rales, rhonchi or wheezing; respirations unlabored    Heart[de-identified]   Regular rate and rhythm; no murmur, rub, or gallop.    Abdomen:   Soft, non-tender, non-distended; normal bowel sounds; no masses, no organomegaly    Genitalia:   Deferred    Rectal:   Deferred    Extremities:  No cyanosis, clubbing or edema    Pulses:  2+ and symmetric    Skin:  No jaundice, rashes, or lesions    Lymph nodes:  No palpable cervical lymphadenopathy        Lab Results:   No visits with results within 1 Day(s) from this visit. Latest known visit with results is:   Office Visit on 09/07/2023   Component Date Value   • Ventricular Rate 09/07/2023 57    • Atrial Rate 09/07/2023 57    • DC Interval 09/07/2023 132    • QRSD Interval 09/07/2023 78    • QT Interval 09/07/2023 416    • QTC Interval 09/07/2023 404    • P Axis 09/07/2023 70    • QRS Axis 09/07/2023 25    • T Wave Axis 09/07/2023 47    • Ventricular Rate 09/07/2023 58    • Atrial Rate 09/07/2023 58    • DC Interval 09/07/2023 128    • QRSD Interval 09/07/2023 80    • QT Interval 09/07/2023 422    • QTC Interval 09/07/2023 414    • P Axis 09/07/2023 73    • QRS Axis 09/07/2023 21    • T Wave Axis 09/07/2023 46          Radiology Results:   No results found.

## 2023-09-15 NOTE — TELEPHONE ENCOUNTER
Scheduled date of colonoscopy (as of today):9/28/23  Physician performing colonoscopy:Dr. Garcia Needs  Location of colonoscopy:[facility]  Bowel prep reviewed with patient:Parul Carney  Instructions reviewed with patient by:sánchez  Clearances: n/a

## 2023-09-15 NOTE — PROGRESS NOTES
Name: Vera Cedeno      : 1965      MRN: [id number]  Encounter Provider: Meseret Duque MD  Encounter Date: 9/15/2023   Encounter department: [address] [place] [address]     1. Cystocele with prolapse  Assessment & Plan:  Awaiting to be evaluated by urologist patient has frequent urination and burning awaiting urine analysis urine culture claims that few years ago was seen by GYN after the hysterectomy and was advised that high risk for cystocele and a prolapse    Orders:  -     Ambulatory Referral to Urology; Future    2. COVID-19  Assessment & Plan:  Completely resolved no further intervention needed    Orders:  -     CBC and differential; Future  -     Comprehensive metabolic panel; Future    3. Prediabetes  Assessment & Plan:  Advised diabetic diet to lose weight very low calorie diet awaiting repeat A1c    Orders:  -     Comprehensive metabolic panel; Future  -     Hemoglobin A1C; Future  -     Albumin / creatinine urine ratio; Future  -     Urinalysis with microscopic; Future    4. Mixed hyperlipidemia  Assessment & Plan:  Last  low-fat low-cholesterol diet awaiting repeat LDL patient refusing statin    Orders:  -     Comprehensive metabolic panel; Future  -     Lipid Panel with Direct LDL reflex; Future    5. Anemia, unspecified type    6. Abnormal LFTs  -     Comprehensive metabolic panel; Future    7. Prolapse of anterior vaginal wall  Assessment & Plan:  Awaiting to be evaluated by GYN history of hysterectomy    Orders:  -     Ambulatory Referral to Obstetrics / Gynecology; Future    8. Vitamin D deficiency  -     Vitamin D 25 hydroxy; Future; Expected date: 10/15/2023    9. Urinary tract infection without hematuria, site unspecified  -     Urine culture  -     Urinalysis with microscopic; Future    10. Screening mammogram, encounter for  -     Mammo screening bilateral w 3d & cad; Future; Expected date: 09/15/2023    11.  Primary osteoarthritis involving multiple joints  Assessment & Plan:  Continue using over-the-counter ibuprofen symptoms controlled             Subjective     Patient came in complaining of frequency of urination no burning for last 2 weeks with suspected urinary bladder prolapse patient feels "something is coming out through the vagina" seen by GI for colonoscopy Cologuard was positive denies any lower abdominal pain history of hysterectomy with salpingo oophorectomy    Review of Systems   Constitutional: Negative for activity change, appetite change, chills, diaphoresis, fatigue, fever and unexpected weight change. HENT: Negative for congestion, dental problem, drooling, ear discharge, ear pain, facial swelling, hearing loss, mouth sores, nosebleeds, postnasal drip, rhinorrhea, sinus pressure, sneezing, sore throat, tinnitus, trouble swallowing and voice change. Eyes: Negative for photophobia, pain, discharge, redness, itching and visual disturbance. Respiratory: Negative for apnea, cough, choking, chest tightness, shortness of breath, wheezing and stridor. Cardiovascular: Negative for chest pain, palpitations and leg swelling. Gastrointestinal: Negative for abdominal distention, abdominal pain, anal bleeding, blood in stool, constipation, diarrhea, nausea, rectal pain and vomiting. Endocrine: Negative for cold intolerance, heat intolerance, polydipsia, polyphagia and polyuria. Genitourinary: Positive for frequency. Negative for decreased urine volume, difficulty urinating, dysuria, enuresis, flank pain, genital sores, hematuria and urgency. Musculoskeletal: Negative for arthralgias, back pain, gait problem, joint swelling, myalgias, neck pain and neck stiffness. Skin: Negative for color change, pallor, rash and wound. Allergic/Immunologic: Negative. Negative for environmental allergies, food allergies and immunocompromised state.    Neurological: Negative for dizziness, tremors, seizures, syncope, facial asymmetry, speech difficulty, weakness, light-headedness, numbness and headaches. Psychiatric/Behavioral: Negative for agitation, behavioral problems, confusion, decreased concentration, dysphoric mood, hallucinations, self-injury, sleep disturbance and suicidal ideas. The patient is not nervous/anxious and is not hyperactive. All other systems reviewed and are negative.       Past Medical History:   Diagnosis Date   • Anxiety    • Psychiatric disorder     anxiety     Past Surgical History:   Procedure Laterality Date   •  SECTION     • HYSTERECTOMY     • MAMMO (HISTORICAL)  2022   • TONSILLECTOMY       Family History   Problem Relation Age of Onset   • Heart disease Mother    • Heart attack Mother 62   • Diabetes Mother    • Diabetes Father    • Heart disease Father    • Atrial fibrillation Father    • Snyder's esophagus Sister    • Diabetes Maternal Grandmother    • Diabetes Maternal Grandfather      Social History     Socioeconomic History   • Marital status: Single     Spouse name: None   • Number of children: None   • Years of education: None   • Highest education level: None   Occupational History   • None   Tobacco Use   • Smoking status: Light Smoker     Types: E-Cigarettes   • Smokeless tobacco: Never   • Tobacco comments:     vape   Substance and Sexual Activity   • Alcohol use: No   • Drug use: No   • Sexual activity: Yes     Partners: Male     Birth control/protection: None   Other Topics Concern   • None   Social History Narrative   • None     Social Determinants of Health     Financial Resource Strain: Not on file   Food Insecurity: Not on file   Transportation Needs: Not on file   Physical Activity: Not on file   Stress: Not on file   Social Connections: Not on file   Intimate Partner Violence: Not on file   Housing Stability: Not on file     Current Outpatient Medications on File Prior to Visit   Medication Sig   • ALPRAZolam (XANAX) 0.5 mg tablet Take 1 tablet (0.5 mg total) by mouth daily at bedtime as needed for anxiety   • escitalopram (LEXAPRO) 10 mg tablet Take 1 tablet (10 mg total) by mouth daily at bedtime   • valACYclovir (VALTREX) 1,000 mg tablet Take 1 tablet (1,000 mg total) by mouth 2 (two) times a day for 2 doses (Patient not taking: Reported on 9/15/2023)   • [DISCONTINUED] gabapentin (NEURONTIN) 100 mg capsule Take 1 capsule (100 mg total) by mouth 3 (three) times a day (Patient not taking: Reported on 7/3/2023)   • [DISCONTINUED] guaifenesin-codeine (GUAIFENESIN AC) 100-10 MG/5ML liquid Take 5 mL by mouth 4 (four) times a day as needed for cough (Patient not taking: Reported on 9/7/2023)   • [DISCONTINUED] Phentermine-Topiramate (Qsymia) 7.5-46 MG CP24 Take 1 tablet by mouth daily (Patient not taking: Reported on 7/3/2023)   • [DISCONTINUED] sertraline (ZOLOFT) 50 mg tablet Take 1 tablet (50 mg total) by mouth daily (Patient not taking: Reported on 9/7/2023)     No Known Allergies  Immunization History   Administered Date(s) Administered   • COVID-19 Moderna Vac BIVALENT 12 Yr+ IM 0.5 ML 12/05/2022       Objective     /90   Pulse 68   Ht 5' 2" (1.575 m)   Wt 81.6 kg (180 lb)   SpO2 95%   BMI 32.92 kg/m²     Physical Exam  Vitals and nursing note reviewed. Constitutional:       Appearance: Normal appearance. She is well-developed. She is obese. She is not ill-appearing. HENT:      Head: Normocephalic and atraumatic. Right Ear: External ear normal.      Left Ear: External ear normal.      Nose: Nose normal. No congestion or rhinorrhea. Mouth/Throat:      Pharynx: Oropharynx is clear. Eyes:      General: Lids are normal. Lids are everted, no foreign bodies appreciated. Conjunctiva/sclera: Conjunctivae normal.      Pupils: Pupils are equal, round, and reactive to light. Neck:      Thyroid: No thyromegaly. Vascular: Normal carotid pulses. No hepatojugular reflux or JVD. Trachea: No tracheal tenderness or tracheal deviation. Cardiovascular:      Rate and Rhythm: Normal rate and regular rhythm. Pulses: Normal pulses. Heart sounds: Normal heart sounds. No murmur heard. Pulmonary:      Effort: Pulmonary effort is normal. No respiratory distress. Breath sounds: Normal breath sounds. Abdominal:      General: Bowel sounds are normal. There is no distension. Palpations: Abdomen is soft. Tenderness: There is no abdominal tenderness. Musculoskeletal:         General: Normal range of motion. Cervical back: Normal range of motion and neck supple. No edema or erythema. No spinous process tenderness or muscular tenderness. Normal range of motion. Right lower leg: No edema. Left lower leg: No edema. Lymphadenopathy:      Head:      Right side of head: No submental, submandibular, tonsillar, preauricular or posterior auricular adenopathy. Left side of head: No submental, submandibular, tonsillar, preauricular, posterior auricular or occipital adenopathy. Cervical:      Right cervical: No superficial, deep or posterior cervical adenopathy. Left cervical: No superficial, deep or posterior cervical adenopathy. Upper Body:      Right upper body: No pectoral adenopathy. Left upper body: No pectoral adenopathy. Skin:     General: Skin is warm and dry. Coloration: Skin is not jaundiced or pale. Neurological:      General: No focal deficit present. Mental Status: She is alert and oriented to person, place, and time. Sensory: No sensory deficit. Motor: No weakness, tremor, abnormal muscle tone or seizure activity. Coordination: Coordination normal.      Gait: Gait normal.      Deep Tendon Reflexes: Reflexes are normal and symmetric. Psychiatric:         Mood and Affect: Mood normal.         Behavior: Behavior normal.         Thought Content:  Thought content normal.         Judgment: Judgment normal.       Shelly Reid MD

## 2023-09-15 NOTE — ASSESSMENT & PLAN NOTE
Awaiting to be evaluated by urologist patient has frequent urination and burning awaiting urine analysis urine culture claims that few years ago was seen by GYN after the hysterectomy and was advised that high risk for cystocele and a prolapse

## 2023-09-15 NOTE — PROGRESS NOTES
[facility] - Outpatient Consultation  Vera Cedeno 66 y.o. female MRN: [id number]  Encounter: [id number]          ASSESSMENT AND PLAN:      1. Positive colorectal cancer screening using Cologuard test  Patient with positive Cologuard testing. Explained to the patient that testing looks for both abnormal DNA as well as blood and does not delineate which is which so she will need further evaluation with colonoscopy. MiraLAX prep has been ordered. Prep and procedure explained to patient. - Ambulatory referral to Gastroenterology  - Colonoscopy; Future    ______________________________________________________________________    HPI:    51-year-old female who presents today for consultation to colonoscopy as she had positive Cologuard. Patient reports that she was having occasional rectal bleeding and notes she has a history of hemorrhoids and has never had a colonoscopy. She states that she has regular bowel movements. She denies any significant upper GI symptoms. Denies any family history of any gastrointestinal malignancy. Does report mother had polyps. REVIEW OF SYSTEMS:    CONSTITUTIONAL: Denies any fever, chills, rigors, and weight loss. HEENT: No earache or tinnitus. Denies hearing loss or visual disturbances. CARDIOVASCULAR: No chest pain or palpitations. RESPIRATORY: Denies any cough, hemoptysis, shortness of breath or dyspnea on exertion. GASTROINTESTINAL: As noted in the History of Present Illness. GENITOURINARY: No problems with urination. Denies any hematuria or dysuria. NEUROLOGIC: No dizziness or vertigo, denies headaches. MUSCULOSKELETAL: Denies any muscle or joint pain. SKIN: Denies skin rashes or itching. ENDOCRINE: Denies excessive thirst. Denies intolerance to heat or cold. PSYCHOSOCIAL: Denies depression or anxiety. Denies any recent memory loss.        Historical Information   Past Medical History:   Diagnosis Date   • Anxiety    • Psychiatric disorder     anxiety     Past Surgical History:   Procedure Laterality Date   •  SECTION     • HYSTERECTOMY     • MAMMO (HISTORICAL)  2022   • TONSILLECTOMY       Social History   Social History     Substance and Sexual Activity   Alcohol Use No     Social History     Substance and Sexual Activity   Drug Use No     Social History     Tobacco Use   Smoking Status Light Smoker   • Types: E-Cigarettes   Smokeless Tobacco Never   Tobacco Comments    vape     Family History   Problem Relation Age of Onset   • Heart disease Mother    • Heart attack Mother 62   • Diabetes Mother    • Diabetes Father    • Heart disease Father    • Atrial fibrillation Father    • Snyder's esophagus Sister    • Diabetes Maternal Grandmother    • Diabetes Maternal Grandfather        Meds/Allergies       Current Outpatient Medications:   •  ALPRAZolam (XANAX) 0.5 mg tablet  •  escitalopram (LEXAPRO) 10 mg tablet  •  gabapentin (NEURONTIN) 100 mg capsule  •  guaifenesin-codeine (GUAIFENESIN AC) 100-10 MG/5ML liquid  •  Phentermine-Topiramate (Qsymia) 7.5-46 MG CP24  •  sertraline (ZOLOFT) 50 mg tablet  •  valACYclovir (VALTREX) 1,000 mg tablet    No Known Allergies        Objective     Blood pressure 132/75, pulse (!) 54, height 5' 2" (1.575 m), weight 81.6 kg (180 lb). Body mass index is 32.92 kg/m². PHYSICAL EXAM:      General Appearance:   Alert, cooperative, no distress   HEENT:   Normocephalic, atraumatic, anicteric.     Neck:  Supple, symmetrical, trachea midline   Lungs:   Clear to auscultation bilaterally; no rales, rhonchi or wheezing; respirations unlabored    Heart[de-identified]   Regular rate and rhythm; no murmur, rub, or gallop.    Abdomen:   Soft, non-tender, non-distended; normal bowel sounds; no masses, no organomegaly    Genitalia:   Deferred    Rectal:   Deferred    Extremities:  No cyanosis, clubbing or edema    Pulses:  2+ and symmetric    Skin:  No jaundice, rashes, or lesions    Lymph nodes:  No palpable cervical lymphadenopathy        Lab Results:   No visits with results within 1 Day(s) from this visit. Latest known visit with results is:   Office Visit on 09/07/2023   Component Date Value   • Ventricular Rate 09/07/2023 57    • Atrial Rate 09/07/2023 57    • ID Interval 09/07/2023 132    • QRSD Interval 09/07/2023 78    • QT Interval 09/07/2023 416    • QTC Interval 09/07/2023 404    • P Axis 09/07/2023 70    • QRS Axis 09/07/2023 25    • T Wave Axis 09/07/2023 47    • Ventricular Rate 09/07/2023 58    • Atrial Rate 09/07/2023 58    • ID Interval 09/07/2023 128    • QRSD Interval 09/07/2023 80    • QT Interval 09/07/2023 422    • QTC Interval 09/07/2023 414    • P Axis 09/07/2023 73    • QRS Axis 09/07/2023 21    • T Wave Axis 09/07/2023 46          Radiology Results:   No results found.

## 2023-09-15 NOTE — PATIENT INSTRUCTIONS
Anxiety   WHAT YOU NEED TO KNOW:   Anxiety is a condition that causes you to feel extremely worried or nervous. The feelings are so strong that they can cause problems with your daily activities or sleep. Anxiety may be triggered by something you fear, or it may happen without a cause. Family or work stress, smoking, caffeine, and alcohol can increase your risk for anxiety. Certain medicines or health conditions can also increase your risk. Anxiety can become a long-term condition if it is not managed or treated. DISCHARGE INSTRUCTIONS:   Call your local emergency number (911 in the [address]) if:   You have chest pain, tightness, or heaviness that may spread to your shoulders, arms, jaw, neck, or back. You feel like hurting yourself or someone else. Call your doctor if:   Your symptoms get worse or do not get better with treatment. Your anxiety keeps you from doing your regular daily activities. You have new symptoms since your last visit. You have questions or concerns about your condition or care. Medicines:   Medicines  may be given to help you feel more calm and relaxed, and decrease your symptoms. Take your medicine as directed. Contact your healthcare provider if you think your medicine is not helping or if you have side effects. Tell your provider if you are allergic to any medicine. Keep a list of the medicines, vitamins, and herbs you take. Include the amounts, and when and why you take them. Bring the list or the pill bottles to follow-up visits. Carry your medicine list with you in case of an emergency. Manage anxiety:   Talk to someone about your anxiety. Your healthcare provider may suggest counseling. Cognitive behavioral therapy can help you understand and change how you react to events that trigger your symptoms. You might feel more comfortable talking with a friend or family member about your anxiety. Choose someone you know will be supportive and encouraging.     Find ways to relax. Activities such as exercise, meditation, or listening to music can help you relax. Spend time with friends, or do things you enjoy. Practice deep breathing. Deep breathing can help you relax when you feel anxious. Focus on taking slow, deep breaths several times a day, or during an anxiety attack. Breathe in through your nose and out through your mouth. Create a regular sleep routine. Regular sleep can help you feel calmer during the day. Go to sleep and wake up at the same times every day. Do not watch television or use the computer right before bed. Your room should be comfortable, dark, and quiet. Eat a variety of healthy foods. Healthy foods include fruits, vegetables, low-fat dairy products, lean meats, fish, whole-grain breads, and cooked beans. Healthy foods can help you feel less anxious and have more energy. Exercise regularly. Exercise can increase your energy level. Exercise may also lift your mood and help you sleep better. Your healthcare provider can help you create an exercise plan. Do not smoke. Nicotine and other chemicals in cigarettes and cigars can increase anxiety. Ask your healthcare provider for information if you currently smoke and need help to quit. E-cigarettes or smokeless tobacco still contain nicotine. Talk to your healthcare provider before you use these products. Do not have caffeine. Caffeine can make your symptoms worse. Do not have foods or drinks that are meant to increase your energy level. Limit or do not drink alcohol. Ask your healthcare provider if alcohol is safe for you. You may not be able to drink alcohol if you take certain anxiety or depression medicines. Limit alcohol to 1 drink per day if you are a woman. Limit alcohol to 2 drinks per day if you are a man. A drink of alcohol is 12 ounces of beer, 5 ounces of wine, or 1½ ounces of liquor. Do not use drugs. Drugs can make your anxiety worse.  It can also make anxiety hard to manage. Talk to your healthcare provider if you use drugs and want help to quit. Follow up with your doctor within 2 weeks or as directed:  Write down your questions so you remember to ask them during your visits. © Copyright [employer] Ching 2022 Information is for End User's use only and may not be sold, redistributed or otherwise used for commercial purposes. The above information is an  only. It is not intended as medical advice for individual conditions or treatments. Talk to your doctor, nurse or pharmacist before following any medical regimen to see if it is safe and effective for you.

## 2023-09-20 ENCOUNTER — TELEPHONE (OUTPATIENT)
Dept: GASTROENTEROLOGY | Facility: CLINIC | Age: 58
End: 2023-09-20

## 2023-09-20 NOTE — TELEPHONE ENCOUNTER
Left message reminding pt of her colonoscopy on 9/28. She will need a , will be called day prior with arrival time and was given her prep instructions at her ov. Any questions, she may call.

## 2023-09-21 LAB
25(OH)D3 SERPL-MCNC: 34 NG/ML (ref 30–100)
ALBUMIN SERPL-MCNC: 4.3 G/DL (ref 3.6–5.1)
ALBUMIN/CREAT UR: NORMAL MCG/MG CREAT
ALBUMIN/GLOB SERPL: 1.5 (CALC) (ref 1–2.5)
ALP SERPL-CCNC: 48 U/L (ref 37–153)
ALT SERPL-CCNC: 25 U/L (ref 6–29)
APPEARANCE UR: CLEAR
AST SERPL-CCNC: 27 U/L (ref 10–35)
BACTERIA UR QL AUTO: ABNORMAL /HPF
BASOPHILS # BLD AUTO: 30 CELLS/UL (ref 0–200)
BASOPHILS NFR BLD AUTO: 0.4 %
BILIRUB SERPL-MCNC: 0.4 MG/DL (ref 0.2–1.2)
BILIRUB UR QL STRIP: NEGATIVE
BUN SERPL-MCNC: 14 MG/DL (ref 7–25)
BUN/CREAT SERPL: NORMAL (CALC) (ref 6–22)
CALCIUM SERPL-MCNC: 9 MG/DL (ref 8.6–10.4)
CHLORIDE SERPL-SCNC: 103 MMOL/L (ref 98–110)
CHOLEST SERPL-MCNC: 217 MG/DL
CHOLEST/HDLC SERPL: 4.5 (CALC)
CO2 SERPL-SCNC: 31 MMOL/L (ref 20–32)
COLOR UR: YELLOW
CREAT SERPL-MCNC: 0.51 MG/DL (ref 0.5–1.03)
CREAT UR-MCNC: 22 MG/DL (ref 20–275)
EOSINOPHIL # BLD AUTO: 178 CELLS/UL (ref 15–500)
EOSINOPHIL NFR BLD AUTO: 2.4 %
ERYTHROCYTE [DISTWIDTH] IN BLOOD BY AUTOMATED COUNT: 12.5 % (ref 11–15)
GFR/BSA.PRED SERPLBLD CYS-BASED-ARV: 109 ML/MIN/1.73M2
GLOBULIN SER CALC-MCNC: 2.8 G/DL (CALC) (ref 1.9–3.7)
GLUCOSE SERPL-MCNC: 91 MG/DL (ref 65–99)
GLUCOSE UR QL STRIP: NEGATIVE
HBA1C MFR BLD: 5.5 % OF TOTAL HGB
HCT VFR BLD AUTO: 38.2 % (ref 35–45)
HDLC SERPL-MCNC: 48 MG/DL
HGB BLD-MCNC: 12.6 G/DL (ref 11.7–15.5)
HGB UR QL STRIP: NEGATIVE
HYALINE CASTS #/AREA URNS LPF: ABNORMAL /LPF
KETONES UR QL STRIP: NEGATIVE
LDLC SERPL CALC-MCNC: 141 MG/DL (CALC)
LEUKOCYTE ESTERASE UR QL STRIP: ABNORMAL
LYMPHOCYTES # BLD AUTO: 2353 CELLS/UL (ref 850–3900)
LYMPHOCYTES NFR BLD AUTO: 31.8 %
MCH RBC QN AUTO: 28.7 PG (ref 27–33)
MCHC RBC AUTO-ENTMCNC: 33 G/DL (ref 32–36)
MCV RBC AUTO: 87 FL (ref 80–100)
MICROALBUMIN UR-MCNC: <0.2 MG/DL
MONOCYTES # BLD AUTO: 570 CELLS/UL (ref 200–950)
MONOCYTES NFR BLD AUTO: 7.7 %
NEUTROPHILS # BLD AUTO: 4270 CELLS/UL (ref 1500–7800)
NEUTROPHILS NFR BLD AUTO: 57.7 %
NITRITE UR QL STRIP: NEGATIVE
NONHDLC SERPL-MCNC: 169 MG/DL (CALC)
PH UR STRIP: 6.5 [PH] (ref 5–8)
PLATELET # BLD AUTO: 288 THOUSAND/UL (ref 140–400)
PMV BLD REES-ECKER: 11.3 FL (ref 7.5–12.5)
POTASSIUM SERPL-SCNC: 3.8 MMOL/L (ref 3.5–5.3)
PROT SERPL-MCNC: 7.1 G/DL (ref 6.1–8.1)
PROT UR QL STRIP: NEGATIVE
RBC # BLD AUTO: 4.39 MILLION/UL (ref 3.8–5.1)
RBC #/AREA URNS HPF: ABNORMAL /HPF
SODIUM SERPL-SCNC: 140 MMOL/L (ref 135–146)
SP GR UR STRIP: 1.01 (ref 1–1.03)
SQUAMOUS #/AREA URNS HPF: ABNORMAL /HPF
TRIGL SERPL-MCNC: 151 MG/DL
WBC # BLD AUTO: 7.4 THOUSAND/UL (ref 3.8–10.8)
WBC #/AREA URNS HPF: ABNORMAL /HPF

## 2023-09-25 ENCOUNTER — TELEPHONE (OUTPATIENT)
Age: 58
End: 2023-09-25

## 2023-09-25 NOTE — TELEPHONE ENCOUNTER
Pt called us back. I do not see a note in reference to the call. If need please call pt again and leave a detailed message as she is at work.

## 2023-09-25 NOTE — TELEPHONE ENCOUNTER
Left message for patient that her blood work is good except for her cholesterol is a little high. Dr. Sean Pastor will discuss with her on her next appointment in December.

## 2023-09-28 ENCOUNTER — ANESTHESIA (OUTPATIENT)
Dept: GASTROENTEROLOGY | Facility: AMBULARY SURGERY CENTER | Age: 58
End: 2023-09-28

## 2023-09-28 ENCOUNTER — ANESTHESIA EVENT (OUTPATIENT)
Dept: GASTROENTEROLOGY | Facility: AMBULARY SURGERY CENTER | Age: 58
End: 2023-09-28

## 2023-09-28 ENCOUNTER — HOSPITAL ENCOUNTER (OUTPATIENT)
Dept: GASTROENTEROLOGY | Facility: AMBULARY SURGERY CENTER | Age: 58
Setting detail: OUTPATIENT SURGERY
End: 2023-09-28
Attending: INTERNAL MEDICINE
Payer: COMMERCIAL

## 2023-09-28 VITALS
TEMPERATURE: 96.7 F | BODY MASS INDEX: 33.13 KG/M2 | HEART RATE: 62 BPM | SYSTOLIC BLOOD PRESSURE: 112 MMHG | RESPIRATION RATE: 16 BRPM | WEIGHT: 180 LBS | OXYGEN SATURATION: 94 % | DIASTOLIC BLOOD PRESSURE: 55 MMHG | HEIGHT: 62 IN

## 2023-09-28 DIAGNOSIS — R19.5 POSITIVE COLORECTAL CANCER SCREENING USING COLOGUARD TEST: ICD-10-CM

## 2023-09-28 PROCEDURE — 45385 COLONOSCOPY W/LESION REMOVAL: CPT | Performed by: INTERNAL MEDICINE

## 2023-09-28 PROCEDURE — 45380 COLONOSCOPY AND BIOPSY: CPT | Performed by: INTERNAL MEDICINE

## 2023-09-28 PROCEDURE — 88305 TISSUE EXAM BY PATHOLOGIST: CPT | Performed by: PATHOLOGY

## 2023-09-28 RX ORDER — PROPOFOL 10 MG/ML
INJECTION, EMULSION INTRAVENOUS AS NEEDED
Status: DISCONTINUED | OUTPATIENT
Start: 2023-09-28 | End: 2023-09-28

## 2023-09-28 RX ORDER — SODIUM CHLORIDE, SODIUM LACTATE, POTASSIUM CHLORIDE, CALCIUM CHLORIDE 600; 310; 30; 20 MG/100ML; MG/100ML; MG/100ML; MG/100ML
125 INJECTION, SOLUTION INTRAVENOUS CONTINUOUS
Status: CANCELLED | OUTPATIENT
Start: 2023-09-28

## 2023-09-28 RX ORDER — PROPOFOL 10 MG/ML
INJECTION, EMULSION INTRAVENOUS CONTINUOUS PRN
Status: DISCONTINUED | OUTPATIENT
Start: 2023-09-28 | End: 2023-09-28

## 2023-09-28 RX ORDER — SODIUM CHLORIDE, SODIUM LACTATE, POTASSIUM CHLORIDE, CALCIUM CHLORIDE 600; 310; 30; 20 MG/100ML; MG/100ML; MG/100ML; MG/100ML
INJECTION, SOLUTION INTRAVENOUS CONTINUOUS PRN
Status: DISCONTINUED | OUTPATIENT
Start: 2023-09-28 | End: 2023-09-28

## 2023-09-28 RX ADMIN — PROPOFOL 120 MG: 10 INJECTION, EMULSION INTRAVENOUS at 09:08

## 2023-09-28 RX ADMIN — SODIUM CHLORIDE, SODIUM LACTATE, POTASSIUM CHLORIDE, AND CALCIUM CHLORIDE: .6; .31; .03; .02 INJECTION, SOLUTION INTRAVENOUS at 08:51

## 2023-09-28 RX ADMIN — PROPOFOL 90 MCG/KG/MIN: 10 INJECTION, EMULSION INTRAVENOUS at 09:08

## 2023-09-28 NOTE — INTERVAL H&P NOTE
H&P reviewed. After examining the patient I find no changes in the patients condition since the H&P had been written.     Vitals:    09/28/23 0832   BP: 122/71   Pulse: 72   Resp: 18   Temp: (!) 96.7 °F (35.9 °C)   SpO2: 96%

## 2023-09-28 NOTE — ANESTHESIA POSTPROCEDURE EVALUATION
Post-Op Assessment Note    CV Status:  Stable    Pain management: adequate     Mental Status:  Alert and awake   Hydration Status:  Euvolemic   PONV Controlled:  Controlled   Airway Patency:  Patent      Post Op Vitals Reviewed: Yes      Staff: CRNA         No notable events documented.     BP   120/56   Temp  98.7   Pulse  70   Resp   18   SpO2   99

## 2023-09-28 NOTE — ANESTHESIA PREPROCEDURE EVALUATION
Procedure:  COLONOSCOPY    Relevant Problems   CARDIO   (+) Atypical chest pain   (+) Mixed hyperlipidemia      HEMATOLOGY   (+) Anemia      MUSCULOSKELETAL   (+) Primary osteoarthritis involving multiple joints      NEURO/PSYCH   (+) Anxiety and depression        Physical Exam    Airway    Mallampati score: II  TM Distance: >3 FB  Neck ROM: full     Dental   No notable dental hx     Cardiovascular  Cardiovascular exam normal    Pulmonary  Pulmonary exam normal     Other Findings        Anesthesia Plan  ASA Score- 2     Anesthesia Type- IV sedation with anesthesia with ASA Monitors. Additional Monitors:   Airway Plan:           Plan Factors-Exercise tolerance (METS): >4 METS. Chart reviewed. Existing labs reviewed. Patient summary reviewed. Patient is a current smoker. Obstructive sleep apnea risk education given perioperatively. Induction-     Postoperative Plan-     Informed Consent- Anesthetic plan and risks discussed with patient. I personally reviewed this patient with the CRNA. Discussed and agreed on the Anesthesia Plan with the CRNA. Micheal Howard

## 2023-10-03 ENCOUNTER — TELEPHONE (OUTPATIENT)
Dept: GASTROENTEROLOGY | Facility: CLINIC | Age: 58
End: 2023-10-03

## 2023-10-03 PROCEDURE — 88305 TISSUE EXAM BY PATHOLOGIST: CPT | Performed by: PATHOLOGY

## 2023-10-03 NOTE — TELEPHONE ENCOUNTER
Left a voicemail for patient to callback for results, provided phone number for her to do so. 3 year colon recall entered into chart.

## 2023-10-03 NOTE — TELEPHONE ENCOUNTER
----- Message from Florentin Vega MD sent at 10/3/2023  9:10 AM EDT -----  Please inform patient that their biopsies were benign.   Repeat colonoscopy 3 years

## 2023-10-13 ENCOUNTER — CONSULT (OUTPATIENT)
Dept: UROLOGY | Facility: CLINIC | Age: 58
End: 2023-10-13
Payer: COMMERCIAL

## 2023-10-13 VITALS
SYSTOLIC BLOOD PRESSURE: 124 MMHG | WEIGHT: 178.8 LBS | BODY MASS INDEX: 32.9 KG/M2 | HEIGHT: 62 IN | OXYGEN SATURATION: 98 % | HEART RATE: 68 BPM | DIASTOLIC BLOOD PRESSURE: 82 MMHG

## 2023-10-13 DIAGNOSIS — R35.1 NOCTURIA: ICD-10-CM

## 2023-10-13 DIAGNOSIS — R35.0 URINARY FREQUENCY: ICD-10-CM

## 2023-10-13 DIAGNOSIS — N81.4 CYSTOCELE WITH PROLAPSE: Primary | ICD-10-CM

## 2023-10-13 LAB
BACTERIA UR QL AUTO: ABNORMAL /HPF
BILIRUB UR QL STRIP: NEGATIVE
CLARITY UR: ABNORMAL
COLOR UR: YELLOW
GLUCOSE UR STRIP-MCNC: NEGATIVE MG/DL
HGB UR QL STRIP.AUTO: NEGATIVE
KETONES UR STRIP-MCNC: NEGATIVE MG/DL
LEUKOCYTE ESTERASE UR QL STRIP: ABNORMAL
MUCOUS THREADS UR QL AUTO: ABNORMAL
NITRITE UR QL STRIP: NEGATIVE
NON-SQ EPI CELLS URNS QL MICRO: ABNORMAL /HPF
PH UR STRIP.AUTO: 7 [PH]
POST-VOID RESIDUAL VOLUME, ML POC: 13 ML
PROT UR STRIP-MCNC: ABNORMAL MG/DL
RBC #/AREA URNS AUTO: ABNORMAL /HPF
SL AMB  POCT GLUCOSE, UA: NORMAL
SL AMB LEUKOCYTE ESTERASE,UA: NORMAL
SL AMB POCT BILIRUBIN,UA: NORMAL
SL AMB POCT BLOOD,UA: NORMAL
SL AMB POCT CLARITY,UA: CLEAR
SL AMB POCT COLOR,UA: YELLOW
SL AMB POCT KETONES,UA: NORMAL
SL AMB POCT NITRITE,UA: NORMAL
SL AMB POCT PH,UA: 5
SL AMB POCT SPECIFIC GRAVITY,UA: 1.01
SL AMB POCT URINE PROTEIN: NORMAL
SL AMB POCT UROBILINOGEN: 0.2
SP GR UR STRIP.AUTO: 1.02 (ref 1–1.03)
UROBILINOGEN UR STRIP-ACNC: <2 MG/DL
WBC #/AREA URNS AUTO: ABNORMAL /HPF

## 2023-10-13 PROCEDURE — 51798 US URINE CAPACITY MEASURE: CPT | Performed by: UROLOGY

## 2023-10-13 PROCEDURE — 99204 OFFICE O/P NEW MOD 45 MIN: CPT | Performed by: UROLOGY

## 2023-10-13 PROCEDURE — 81002 URINALYSIS NONAUTO W/O SCOPE: CPT | Performed by: UROLOGY

## 2023-10-13 PROCEDURE — 81001 URINALYSIS AUTO W/SCOPE: CPT | Performed by: UROLOGY

## 2023-10-13 PROCEDURE — 87086 URINE CULTURE/COLONY COUNT: CPT | Performed by: UROLOGY

## 2023-10-13 NOTE — PROGRESS NOTES
UROLOGY NEW PATIENT ENCOUNTER    Joaquin Morales is a 66 y.o. female with prolapse. [place] with RSO - still has L ovary    No anticoagulation    4 vaginal deliveries, 1     LUTS: nocturia every hour (she does not wake every time due to having to void - for instance may wake up for noise in room, but urinates just in case); day time freq q1 hour    Denied GH, dysuria, no incontinence, no urgency    1 cup coffee in AM; drinks water throughout day; does not drink much water after dinner    U dip 10/13/23: small LE, -N, neg blood    PVR 13 cc on 10/13/23    Saw urologist in [place] around  for urinary leakage that resolved shortly after    Around 2023 noticed outpouching of vagina. She says this is constant. This does not get worse with straining. Assessment and plan:     Stage II pelvic organ prolapse    46-year-old female status post 4 vaginal deliveries. She was referred to the office for vaginal prolapse. She stated that starting around 2023 she noticed some outpouching of her vagina. She states that it is pretty constant. She does not notice that it gets particularly worse with straining. On physical exam today, she appeared to have stage II pelvic organ prolapse due to enterocele component. She does not appear to have significant prolapse in the anterior or posterior vaults. She had a PVR 13 cc in the office today. I reviewed this with the patient and explained that she is emptying her bladder well. She does not have any history of urinary incontinence for many years. I explained to the patient that females with stage I or stage II pelvic organ prolapse are candidates for pelvic floor physical therapy trial to see if this alone will improve the prolapse. I explained that the cure rate is certainly not 678% and that she still may need surgery in the future. She verbalized understanding.   She stated that she would like to try conservative management with physical therapy prior to surgery. I explained to her that we can have her come back to the clinic in about 3 to 4 months and assess how she is doing after physical therapy. If she is still not happy with symptoms, we can have surgical discussion then. All questions were answered. She was in agreement with this plan. Nocturia    Patient states that she wakes up about once an hour at night and urinates throughout the night. Upon further questioning, patient explained that the majority of her waking at night is not due to the fact that she has to urinate. Instead, she gets woken up easily by noise or anxiety. When she happens to be awake, she will try to urinate. However, she does not necessarily wake up due to the urge to urinate. I explained that this was quite different than true nocturia, which was caused by waking due to sensation of full bladder needing to urinate. She will monitor her symptoms and will address again with us if she has further concerns. Her urine was sent from the office today for UA and culture. We will call her and treat with antibiotics if positive. PLAN  -Patient will start pelvic floor physical therapy she will try this for a few months to see if this improves her prolapse. -RTC in 3-4 months to reassess symptoms. Can consider surgical discussion then if she is not happy with pelvic floor physical therapy. If considering surgery, we need to address her enterocele. This could be addressed with uterosacral ligament suspension or sacrospinous ligament suspension. At time of surgery, we would have to assess if she has concurrent anterior compartment prolapse after correcting her apical prolapse with a suspension. This is the case, she would get a concurrent anterior repair (cystocele repair). -Follow-up UA and urine culture. We will call her if this is positive then she needs to be treated for infection.     Portions of the above record have been created with voice recognition software. Occasional wrong word or "sound alike" substitution may have occurred due to the inherent limitations of voice recognition software. Read the chart carefully and recognize, using context, where substitution may have occurred. Víctor Barahona DO        Chief Complaint     Pelvic organ prolapse      History of Present Illness     Howard Renteria is a 66 y.o. female presenting in consultation for pelvic organ prolapse. The patient was referred by Dr. Kennedy Orozco and today's note has been sent to the referring provider. See summary above    No recent fevers or chills          The following portions of the patient's history were reviewed and updated as appropriate: allergies, current medications, past family history, past medical history, past social history, past surgical history and problem list.        Review of Systems     Review of Systems   Constitutional:  Negative for chills and fever. Respiratory:  Negative for cough and shortness of breath. Gastrointestinal:  Negative for abdominal pain and nausea. Genitourinary:  Negative for dysuria and hematuria. Neurological:  Negative for dizziness and headaches. Psychiatric/Behavioral:  Negative for agitation and behavioral problems. Allergies     No Known Allergies    Physical Exam     Physical Exam  Constitutional:       Appearance: Normal appearance. HENT:      Head: Normocephalic and atraumatic. Pulmonary:      Effort: Pulmonary effort is normal. No respiratory distress. Abdominal:      General: Abdomen is flat. Palpations: Abdomen is soft. Tenderness: There is no right CVA tenderness or left CVA tenderness. Genitourinary:     General: Normal vulva. Comments: Pelvic exam with speculum performed with female chaperone 10/13/2023:  POP-Q  Aa: -3  C: -0.5  Ap: -3  Stage II pelvic organ prolapse with enterocele  Skin:     General: Skin is warm and dry.    Neurological:      General: No focal deficit present. Mental Status: She is alert and oriented to person, place, and time. Psychiatric:         Mood and Affect: Mood normal.         Behavior: Behavior normal.             Vital Signs  Vitals:    10/13/23 1528   BP: 124/82   BP Location: Left arm   Patient Position: Sitting   Cuff Size: Adult   Pulse: 68   SpO2: 98%   Weight: 81.1 kg (178 lb 12.8 oz)   Height: 5' 2" (1.575 m)         Current Medications       Current Outpatient Medications:     escitalopram (LEXAPRO) 10 mg tablet, Take 1 tablet (10 mg total) by mouth daily at bedtime, Disp: 30 tablet, Rfl: 5    ALPRAZolam (XANAX) 0.5 mg tablet, Take 1 tablet (0.5 mg total) by mouth daily at bedtime as needed for anxiety (Patient not taking: Reported on 10/13/2023), Disp: 20 tablet, Rfl: 0    valACYclovir (VALTREX) 1,000 mg tablet, Take 1 tablet (1,000 mg total) by mouth 2 (two) times a day for 2 doses (Patient not taking: Reported on 9/15/2023), Disp: 2 tablet, Rfl: 0      Active Problems     Patient Active Problem List   Diagnosis    Atypical chest pain    Plantar fasciitis    Pain in both feet    Congenital pes planus of right foot    Congenital pes planus of left foot    Tinea pedis of both feet    Disorder of right eustachian tube    Obesity (BMI 30-39. 9)    Abnormal LFTs    Anemia    Hyperglycemia    Primary osteoarthritis involving multiple joints    Prediabetes    Mixed hyperlipidemia    Anxiety and depression    COVID-19    Cold sore    Cystocele with prolapse    Prolapse of anterior vaginal wall         Past Medical History     Past Medical History:   Diagnosis Date    Anxiety     Psychiatric disorder     anxiety         Surgical History     Past Surgical History:   Procedure Laterality Date     SECTION      HYSTERECTOMY      MAMMO (HISTORICAL)  2022    TONSILLECTOMY           Family History     Family History   Problem Relation Age of Onset    Heart disease Mother     Heart attack Mother 62    Diabetes Mother Diabetes Father     Heart disease Father     Atrial fibrillation Father     Snyder's esophagus Sister     Diabetes Maternal Grandmother     Diabetes Maternal Grandfather          Social History     Social History     Social History     Tobacco Use   Smoking Status Light Smoker    Types: E-Cigarettes   Smokeless Tobacco Never   Tobacco Comments    vape         Pertinent Lab Values     Lab Results   Component Value Date    CREATININE 0.51 09/21/2023       No results found for: "PSA"        Pertinent Imaging     N/A      Pertinent Pathology     N/A        I have spent 45 minutes with Patient  today in which greater than 50% of this time was spent in counseling/coordination of care regarding Diagnostic results, Prognosis, Risks and benefits of tx options, Instructions for management, Patient and family education, Importance of tx compliance, Risk factor reductions, Impressions, Counseling / Coordination of care, Documenting in the medical record, Reviewing / ordering tests, medicine, procedures  , and Obtaining or reviewing history  . Please note this time includes cumulative time on the day of encounter, including reviewing medical records and/or coordinating care among the patient's other specialists.

## 2023-10-13 NOTE — LETTER
2023     Jessenia Houston, [address]  [address] [facility] [address]    Patient: Brendon Apley   YOB: 1965   Date of Visit: 10/13/2023       Dear Dr. Teri Perez: Thank you for referring Brendon Apley to me for evaluation. Below are my notes for this consultation. If you have questions, please do not hesitate to call me. I look forward to following your patient along with you. Sincerely,        Cherie Baig DO        CC: No Recipients    Diego Ashton  10/13/2023  5:09 PM  Sign when Signing Visit  UROLOGY NEW PATIENT ENCOUNTER    Brendon Apley is a 66 y.o. female with prolapse. [place] with RSO - still has L ovary    No anticoagulation    4 vaginal deliveries, 1     LUTS: nocturia every hour (she does not wake every time due to having to void - for instance may wake up for noise in room, but urinates just in case); day time freq q1 hour    Denied GH, dysuria, no incontinence, no urgency    1 cup coffee in AM; drinks water throughout day; does not drink much water after dinner    U dip 10/13/23: small LE, -N, neg blood    PVR 13 cc on 10/13/23    Saw urologist in [place] around  for urinary leakage that resolved shortly after    Around 2023 noticed outpouching of vagina. She says this is constant. This does not get worse with straining. Assessment and plan:     Stage II pelvic organ prolapse    53-year-old female status post 4 vaginal deliveries. She was referred to the office for vaginal prolapse. She stated that starting around 2023 she noticed some outpouching of her vagina. She states that it is pretty constant. She does not notice that it gets particularly worse with straining. On physical exam today, she appeared to have stage II pelvic organ prolapse due to enterocele component. She does not appear to have significant prolapse in the anterior or posterior vaults.     She had a PVR 13 cc in the office today. I reviewed this with the patient and explained that she is emptying her bladder well. She does not have any history of urinary incontinence for many years. I explained to the patient that females with stage I or stage II pelvic organ prolapse are candidates for pelvic floor physical therapy trial to see if this alone will improve the prolapse. I explained that the cure rate is certainly not 987% and that she still may need surgery in the future. She verbalized understanding. She stated that she would like to try conservative management with physical therapy prior to surgery. I explained to her that we can have her come back to the clinic in about 3 to 4 months and assess how she is doing after physical therapy. If she is still not happy with symptoms, we can have surgical discussion then. All questions were answered. She was in agreement with this plan. Nocturia    Patient states that she wakes up about once an hour at night and urinates throughout the night. Upon further questioning, patient explained that the majority of her waking at night is not due to the fact that she has to urinate. Instead, she gets woken up easily by noise or anxiety. When she happens to be awake, she will try to urinate. However, she does not necessarily wake up due to the urge to urinate. I explained that this was quite different than true nocturia, which was caused by waking due to sensation of full bladder needing to urinate. She will monitor her symptoms and will address again with us if she has further concerns. Her urine was sent from the office today for UA and culture. We will call her and treat with antibiotics if positive. PLAN  -Patient will start pelvic floor physical therapy she will try this for a few months to see if this improves her prolapse. -RTC in 3-4 months to reassess symptoms.   Can consider surgical discussion then if she is not happy with pelvic floor physical therapy. If considering surgery, we need to address her enterocele. This could be addressed with uterosacral ligament suspension or sacrospinous ligament suspension. At time of surgery, we would have to assess if she has concurrent anterior compartment prolapse after correcting her apical prolapse with a suspension. This is the case, she would get a concurrent anterior repair (cystocele repair). -Follow-up UA and urine culture. We will call her if this is positive then she needs to be treated for infection. Portions of the above record have been created with voice recognition software. Occasional wrong word or "sound alike" substitution may have occurred due to the inherent limitations of voice recognition software. Read the chart carefully and recognize, using context, where substitution may have occurred. Abbey Johnson, DO        Chief Complaint     Pelvic organ prolapse      History of Present Illness     Charo Lozano is a 66 y.o. female presenting in consultation for pelvic organ prolapse. The patient was referred by Dr. Mehreen Hahn and today's note has been sent to the referring provider. See summary above    No recent fevers or chills          The following portions of the patient's history were reviewed and updated as appropriate: allergies, current medications, past family history, past medical history, past social history, past surgical history and problem list.        Review of Systems     Review of Systems   Constitutional:  Negative for chills and fever. Respiratory:  Negative for cough and shortness of breath. Gastrointestinal:  Negative for abdominal pain and nausea. Genitourinary:  Negative for dysuria and hematuria. Neurological:  Negative for dizziness and headaches. Psychiatric/Behavioral:  Negative for agitation and behavioral problems.             Allergies     No Known Allergies    Physical Exam     Physical Exam  Constitutional:       Appearance: Normal appearance. HENT:      Head: Normocephalic and atraumatic. Pulmonary:      Effort: Pulmonary effort is normal. No respiratory distress. Abdominal:      General: Abdomen is flat. Palpations: Abdomen is soft. Tenderness: There is no right CVA tenderness or left CVA tenderness. Genitourinary:     General: Normal vulva. Comments: Pelvic exam with speculum performed with female chaperone 10/13/2023:  POP-Q  Aa: -3  C: -0.5  Ap: -3  Stage II pelvic organ prolapse with enterocele  Skin:     General: Skin is warm and dry. Neurological:      General: No focal deficit present. Mental Status: She is alert and oriented to person, place, and time. Psychiatric:         Mood and Affect: Mood normal.         Behavior: Behavior normal.             Vital Signs  Vitals:    10/13/23 1528   BP: 124/82   BP Location: Left arm   Patient Position: Sitting   Cuff Size: Adult   Pulse: 68   SpO2: 98%   Weight: 81.1 kg (178 lb 12.8 oz)   Height: 5' 2" (1.575 m)         Current Medications       Current Outpatient Medications:   •  escitalopram (LEXAPRO) 10 mg tablet, Take 1 tablet (10 mg total) by mouth daily at bedtime, Disp: 30 tablet, Rfl: 5  •  ALPRAZolam (XANAX) 0.5 mg tablet, Take 1 tablet (0.5 mg total) by mouth daily at bedtime as needed for anxiety (Patient not taking: Reported on 10/13/2023), Disp: 20 tablet, Rfl: 0  •  valACYclovir (VALTREX) 1,000 mg tablet, Take 1 tablet (1,000 mg total) by mouth 2 (two) times a day for 2 doses (Patient not taking: Reported on 9/15/2023), Disp: 2 tablet, Rfl: 0      Active Problems     Patient Active Problem List   Diagnosis   • Atypical chest pain   • Plantar fasciitis   • Pain in both feet   • Congenital pes planus of right foot   • Congenital pes planus of left foot   • Tinea pedis of both feet   • Disorder of right eustachian tube   • Obesity (BMI 30-39. 9)   • Abnormal LFTs   • Anemia   • Hyperglycemia   • Primary osteoarthritis involving multiple joints   • Prediabetes   • Mixed hyperlipidemia   • Anxiety and depression   • COVID-19   • Cold sore   • Cystocele with prolapse   • Prolapse of anterior vaginal wall         Past Medical History     Past Medical History:   Diagnosis Date   • Anxiety    • Psychiatric disorder     anxiety         Surgical History     Past Surgical History:   Procedure Laterality Date   •  SECTION     • HYSTERECTOMY     • MAMMO (HISTORICAL)  2022   • TONSILLECTOMY           Family History     Family History   Problem Relation Age of Onset   • Heart disease Mother    • Heart attack Mother 62   • Diabetes Mother    • Diabetes Father    • Heart disease Father    • Atrial fibrillation Father    • Snyder's esophagus Sister    • Diabetes Maternal Grandmother    • Diabetes Maternal Grandfather          Social History     Social History    Social History     Tobacco Use   Smoking Status Light Smoker   • Types: E-Cigarettes   Smokeless Tobacco Never   Tobacco Comments    vape         Pertinent Lab Values     Lab Results   Component Value Date    CREATININE 0.51 2023       No results found for: "PSA"        Pertinent Imaging     N/A      Pertinent Pathology     N/A        I have spent 45 minutes with Patient  today in which greater than 50% of this time was spent in counseling/coordination of care regarding Diagnostic results, Prognosis, Risks and benefits of tx options, Instructions for management, Patient and family education, Importance of tx compliance, Risk factor reductions, Impressions, Counseling / Coordination of care, Documenting in the medical record, Reviewing / ordering tests, medicine, procedures  , and Obtaining or reviewing history  . Please note this time includes cumulative time on the day of encounter, including reviewing medical records and/or coordinating care among the patient's other specialists.

## 2023-10-15 LAB — BACTERIA UR CULT: NORMAL

## 2023-10-20 ENCOUNTER — EVALUATION (OUTPATIENT)
Dept: PHYSICAL THERAPY | Facility: CLINIC | Age: 58
End: 2023-10-20
Payer: COMMERCIAL

## 2023-10-20 DIAGNOSIS — N81.4 CYSTOCELE WITH PROLAPSE: ICD-10-CM

## 2023-10-20 DIAGNOSIS — N81.89 WEAKNESS OF PELVIC FLOOR: Primary | ICD-10-CM

## 2023-10-20 PROCEDURE — 97163 PT EVAL HIGH COMPLEX 45 MIN: CPT

## 2023-10-20 NOTE — PROGRESS NOTES
PT Evaluation     Today's date: 10/20/2023  Patient name: Krystal Yusuf  : 1965  MRN: [id number]  Referring provider: Wendi Villegas  Dx:   Encounter Diagnosis     ICD-10-CM    1. Weakness of pelvic floor  N81.89       2. Cystocele with prolapse  N81.4 Ambulatory referral to Physical Therapy            Assessment  Assessment details: Krystal Yusuf is a 66 y.o. female  who presents to outpatient pelvic floor physical therapy with the following complaints: increased night time urination and pelvic heaviness and bulging. Patient's presentation is consistent with a grade II pelvic organ prolapse, supported by findings from the examination including: increased excursion of anterior wall with bear down, significant pelvic floor weakness, decreased TrA activation, and absent PFM contraction with cough. This patient is functionally limited in work related duties, lifting objects, engaging in sexual activity, and disrupted sleep. Krystal Yusuf demonstrates good rehab potential and would benefit from skilled pelvic floor physical therapy to address the above complaints and functional limitations. Impairments: abnormal coordination, abnormal muscle firing, abnormal or restricted ROM, activity intolerance, lacks appropriate home exercise program, poor posture  and poor body mechanics    Symptom irritability: moderateUnderstanding of Dx/Px/POC: good   Prognosis: good    Goals  STG to be completed in 4 weeks (23): Patient will demonstrate proper lifting techniques  Patient will report decreased nocturia from 8 to 5 times a night  Patient will demonstrate independence in PFM and breathing sequence  Patient will report appropriate water intake (recommended 90 oz) and decreased prior to bed time       LTG to be completed in 8 weeks (12/15/23):   Patient will demonstrate independence with comprehensive home exercise program.  Patient will demonstrate at least one full grade improvement in MMT scores from evaluation  Patient will report maximum 3x a night nocturia  Patient will demonstrate reversal in severity of POP from grade II to grade I.       Plan  Plan details: Plan to focus on strengthening of pelvic floor muscles, pressure management/lifting techniques, and abdominal strengthening. Biofeedback can be used . Patient would benefit from: skilled physical therapy  Planned modality interventions: biofeedback, ultrasound, electrical stimulation/Russian stimulation, cryotherapy and thermotherapy: hydrocollator packs  Planned therapy interventions: abdominal trunk stabilization, activity modification, ADL retraining, balance/weight bearing training, behavior modification, body mechanics training, breathing training, massage, manual therapy, joint mobilization, Leonard taping, nerve gliding, neuromuscular re-education, patient education, postural training, strengthening, stretching, therapeutic activities, functional ROM exercises, flexibility, fine motor coordination training, home exercise program and therapeutic exercise  Frequency: 1x week  Duration in weeks: 8  Plan of Care beginning date: 10/20/2023  Plan of Care expiration date: 12/15/2023  Treatment plan discussed with: patient        PT Pelvic Floor Subjective:   History of Present Illness:   Patient presents with a history of pelvic organ prolapse and frequency with nocturia. Patient started noticing a bulge while wiping around Sept 2023 with associated fullness. "Feels like a fist". Made appt with urogyn who diagnosed Stage II POP. With frequency at night time, patient notes waking up to use bathroom not always from urge to urinate but from other external factors (needs to vape, drink water), then will "go just in case". 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 Urinary habits: 15x voids/day. Able to start stream of urine, and characterizes it as a weak stream on average. 8x nocturia (every hour). Denies feelings of incomplete voiding.  Denies pain with urination. Denies urinary incontinence of any kind. Fluid intake: Water: 100 oz, 8 oz coffee     Diet and fiber intake: inconsistent diet, lots of cereal    Activity level: 4-6 miles walking at work, physically active with work    Bowel habits: 1-2 BM/day. Denies straining, constipation, pain with defecation. Reports type 3-4 stool using the BSS. GYN habits: Reports not seeing GYN regularly. Patient is  with 4 vaginal deliveries and 1 , endorses (perineal tearing, episiotomies. Endorses internal hemorrhoids that will bleed 3-4x year. Denies endometriosis. Menopausal 15 years ago. Denies recurrent history of UTIs, kidney stones. Patient is currently sexually active and denies pain with sexual activity. MSK system history: (hip, back, pelvic pain)  - History of surgery: Total hysterectomy 15 years ago, except for 1 ovary       Menstrual History:      Birth control: EPIC#[id number]^NOTE][      Objective       Abdominal Assessment:        Diastatis   unable to engage transverse abdominis     Visual Inspection of Perineum:   Excursion of perineal body in cephalad direction with contraction of pelvic floor muscles (PFM): weak  Excursion of perineal body in caudal direction with relaxation of pelvic floor muscles (PFM): weak  Involuntary contraction with coughing: no  Involuntary relaxation with bearing down: yes  Sensation: intact    Pelvic Organ Prolapse   Position: hook-lying  At rest: mild, moderate and mild  With bearing down: moderate (to the level of hymenal remnants)  Location: anterior  Perineal body inspection: within normal limits        Pelvic Floor Muscle Exam:     Muscle Contraction: substitution   Breathing pattern with contraction: holding breath   Pelvic floor muscle relaxation is complete.          PERFECT Score   Power right: 1/5   Power left: 1/5   Endurance (seconds to max): 1   Repetitions (before fatigue): 2        pelvic floor exam consent given by patient    Pelvic exam completed: vaginally         Insurance:  AMA/CMS Eval/ Re-eval POC expires Noberto Cipro #/ Referral # Total    Start date  Expiration date Extension  Visit limitation? PT only or  PT+OT?  Co-Insurance   Cms? 10/20 12/15/23  30 PT Christina Lyles #:  Date 10/20              Authed: Used 1               Remaining  29                    Precautions: standard, POP stage II    BILL 2 TE   10/6/23 *discuss nocturia more in detail       VISIT 1        Manuals         PFM exam performed        Abdominal mobilizations NV assess        Scar mobilization NV        Neuro Re-Ed         DB+PFMC Reviewed        TA+PFMC HEP        Bridges HEP        Clamshells HEP        TA+march HEP        Biofeedback                  Ther Ex         Sit to stand +PFMC NV        Lateral walks with band         OH carry with TB         SLR         Squats         Lunges         Walking         Paloff press                           Ther Activity         Voiding diary NV        Knack         Fiber intake education         Bowel habits education         Bladder irritants education         Toileting posture Reviewed        Urge deferral strategies NV        Dilator/wand education

## 2023-11-03 ENCOUNTER — OFFICE VISIT (OUTPATIENT)
Dept: PHYSICAL THERAPY | Facility: CLINIC | Age: 58
End: 2023-11-03
Payer: COMMERCIAL

## 2023-11-03 DIAGNOSIS — N81.4 CYSTOCELE WITH PROLAPSE: Primary | ICD-10-CM

## 2023-11-03 DIAGNOSIS — N81.89 WEAKNESS OF PELVIC FLOOR: ICD-10-CM

## 2023-11-03 PROCEDURE — 97110 THERAPEUTIC EXERCISES: CPT

## 2023-11-03 NOTE — PROGRESS NOTES
Daily Note     Today's date: 11/3/2023  Patient name: Charo Lozano  : 1965  MRN: [id number]  Referring provider: Abbey Johnson  Dx:   Encounter Diagnosis     ICD-10-CM    1. Cystocele with prolapse  N81.4       2. Weakness of pelvic floor  N81.89             Subjective: Patient has not completed HEP, pt notes that significant other does not want to partake in sexual intercourse secondary to prolapse. Patient has been "walking 6-10 miles a day." Patient continues to have frustration regarding symptoms and the fact that her next f/u with ref provider isn't until Feb.      Objective: See treatment diary below. Supine to long sit test + for R anteriorly rotated innominate. Equal leg length post grade 5 manip. Assessment: Tolerated treatment well. Discussed nocturia: patient is using vape every hour in the night which leads to hourly nocturia- is not drinking much water while awake, is trying not to drink water close to bedtime. This seems to be more of a behavioral pattern that may be irrespective of pelvic floor function-secondary to nicotine being a bladder irritant. Has incorporated pressure management at work- breathing out with exertion. Requires frequent verbal cueing for breath sequence during intro to new TE s today, notes feeling of "letting go " of PFMC during bridges and SLR. Reviewed function of pessarys for pt to ask ref provider. Patient demonstrated fatigue post treatment and would benefit from continued PT to strengthen PFMs. Plan: Continue per plan of care. Continue strengthening protocol.      Precautions: standard, POP stage II    BILL 2 TE   10/20/23 11/3/23         VISIT 1        Manuals         PFM exam performed Grade 5 innominate manip (was ant rotated) +RLE long axis distraction        Abdominal mobilizations NV assess        Scar mobilization NV        Neuro Re-Ed         DB+PFMC Reviewed 5' warm up        TA+PFMC HEP Pelvic tilt +PFMC 2x10       Add sq+PFMC 2x10x4"       Bridges HEP 2x10 +PFMC       Clamshells HEP NV       SLR  1x10 each +PFMC       TA+march HEP 2x10 +PFMC       Biofeedback  NV                Ther Ex         Sit to stand +PFMC NV Seated PFMC 20x with DB  10x STS         Lateral walks with band         OH carry with TB         SLR         Squats         Lunges         Walking         Paloff press                           Ther Activity         Voiding diary NV        Knack         Fiber intake education         Bowel habits education         Bladder irritants education         Toileting posture Reviewed        Urge deferral strategies NV        Dilator/wand education

## 2024-02-16 ENCOUNTER — TELEPHONE (OUTPATIENT)
Age: 59
End: 2024-02-16

## 2024-04-08 DIAGNOSIS — F41.9 ANXIETY AND DEPRESSION: ICD-10-CM

## 2024-04-08 DIAGNOSIS — F32.A ANXIETY AND DEPRESSION: ICD-10-CM

## 2024-04-08 RX ORDER — ESCITALOPRAM OXALATE 10 MG/1
10 TABLET ORAL
Qty: 30 TABLET | Refills: 5 | Status: SHIPPED | OUTPATIENT
Start: 2024-04-08

## 2024-04-08 NOTE — TELEPHONE ENCOUNTER
Reason for call: No medication left   [x] Refill   [] Prior Auth  [] Other:     Office:   [x] PCP/Provider - Dr Nichols  [] Specialty/Provider -     Medication: escitalopram     Dose/Frequency: 10 mg at bedtime    Quantity: 30D w refill    Pharmacy: [facility] on file     Does the patient have enough for 3 days?   [] Yes   [x] No - Send as HP to POD

## 2024-06-24 ENCOUNTER — TELEPHONE (OUTPATIENT)
Dept: UROLOGY | Facility: CLINIC | Age: 59
End: 2024-06-24

## 2024-06-24 NOTE — TELEPHONE ENCOUNTER
Patient is scheduled here at [facility] with [facility]ino. The appointment needed to be with Dr Ortiz to discuss surgery. Constantino is canceling todays appointment. Can patient be fit into his schedule  this week. It was in the notes that she needed FU with Diana.

## 2024-06-24 NOTE — TELEPHONE ENCOUNTER
"I think 2/9 was a day that Dr. Ortiz was on leave but I could be wrong. When I look back at the schedule it says \"personal\". I think this was a triaged appointment that could be with an AP. Please advise.   "

## 2024-06-24 NOTE — TELEPHONE ENCOUNTER
Mirtha the patient will take 7/23, at 1:00 pm. The location has been confirmed and date and time. TY

## 2024-07-23 ENCOUNTER — OFFICE VISIT (OUTPATIENT)
Dept: UROLOGY | Facility: CLINIC | Age: 59
End: 2024-07-23
Payer: COMMERCIAL

## 2024-07-23 VITALS
HEART RATE: 74 BPM | DIASTOLIC BLOOD PRESSURE: 74 MMHG | BODY MASS INDEX: 34.04 KG/M2 | WEIGHT: 185 LBS | HEIGHT: 62 IN | SYSTOLIC BLOOD PRESSURE: 138 MMHG | OXYGEN SATURATION: 98 %

## 2024-07-23 DIAGNOSIS — N81.10 VAGINAL PROLAPSE: ICD-10-CM

## 2024-07-23 DIAGNOSIS — R35.0 URINARY FREQUENCY: Primary | ICD-10-CM

## 2024-07-23 LAB
POST-VOID RESIDUAL VOLUME, ML POC: 13 ML
SL AMB  POCT GLUCOSE, UA: NORMAL
SL AMB LEUKOCYTE ESTERASE,UA: NORMAL
SL AMB POCT BILIRUBIN,UA: NORMAL
SL AMB POCT BLOOD,UA: NORMAL
SL AMB POCT CLARITY,UA: NORMAL
SL AMB POCT COLOR,UA: YELLOW
SL AMB POCT KETONES,UA: NORMAL
SL AMB POCT NITRITE,UA: NORMAL
SL AMB POCT PH,UA: 5
SL AMB POCT SPECIFIC GRAVITY,UA: 1.03
SL AMB POCT URINE PROTEIN: NORMAL
SL AMB POCT UROBILINOGEN: 0.2

## 2024-07-23 PROCEDURE — 99215 OFFICE O/P EST HI 40 MIN: CPT | Performed by: UROLOGY

## 2024-07-23 PROCEDURE — 51798 US URINE CAPACITY MEASURE: CPT | Performed by: UROLOGY

## 2024-07-23 PROCEDURE — 81002 URINALYSIS NONAUTO W/O SCOPE: CPT | Performed by: UROLOGY

## 2024-07-23 RX ORDER — SODIUM CHLORIDE 9 MG/ML
75 INJECTION, SOLUTION INTRAVENOUS CONTINUOUS
OUTPATIENT
Start: 2024-07-23

## 2024-07-23 NOTE — PROGRESS NOTES
UROLOGY FOLLOW-UP ENCOUNTER    Norma Sanchez is a 59 y.o. female with prolapse.    INDIA with RSO - still has L ovary    No anticoagulation    4 vaginal deliveries, 1     Smokes e-cigs    LUTS: nocturia every hour (she does not wake every time due to having to void - for instance may wake up for noise in room, but urinates just in case); day time freq q1 hour    Denied GH, dysuria, no incontinence, no urgency    1 cup coffee in AM; drinks water throughout day; does not drink much water after dinner    U dip 10/13/23: small LE, -N, neg blood    PVR 13 cc on 10/13/23    Saw urologist in [place] around  for urinary leakage that resolved shortly after    Around 2023 noticed outpouching of vagina. She says this is constant.  This does not get worse with straining.    Pelvic exam with speculum performed with female chaperone 10/13/2023:  POP-Q  Aa: -3  C: -0.5  Ap: -3  Stage II pelvic organ prolapse with enterocele    Tried PFPT but this did not help    U dip 24: neg    PVR 13 cc on 24    Assessment and plan:     Stage II pelvic organ prolapse    Patient with pelvic organ prolapse, was stage II, approaching stage III when I did a pelvic exam on her in 2023.  At that time, I gave her the option of proceeding with surgery or trying physical therapy.  She did try some physical therapy, but this did not make the prolapse any better.    We went over the option of simply monitoring this over time and not intervening.  I explained that this was an elective issue to fix.  Overall, she is quite bothered by this and would like this fixed.  She wanted talk about surgical options.    We discussed the option of placing a pessary.  We talked about what a pessary was and what pessary fitting would entail.  She stated that because she was young, she did not want to have a pessary for the rest of her life, which I thought was reasonable.  She therefore wanted to discuss surgical options.    I first talked  to her about sacrocolpopexy.  I talked about the steps of the procedure.  I explained that it is typically done robotically these days.  I explained that if she wanted to pursue this option, I would refer her to the urogynecology team.  I explained that this is typically the most definitive way to treat pelvic organ prolapse.  She stated that she would like to avoid abdominal surgery if at all possible.    We did discuss other surgical options.    I discussed the option of performing sacrospinous ligament suspension to tack the apex of the vagina to nearby ligament tissue to hopefully prevent future apical excursion.  I talked about the steps of the procedure.  I talked about the approach which would be through her vaginal incision.  We went over risks of the procedure which include but are not limited to: General anesthesia risk, damage nearby structures including intra-abdominal injury, bleeding, need for more procedures, lack of efficacy meaning that the repair may not hold and she may have representation of her vaginal prolapse, urethral injury requiring Leal catheter, pain with sex, pain with sex for partner.    I explained that sometimes when we fixed the apical excursion, it does reveal other vaginal prolapse issues, like cystocele.  I explained that oftentimes when patients have sacrospinous ligament suspension, they also get a concomitant cystocele repair for preventative reasons.  I therefore stated that we would also plan on doing cystocele repair at the time of surgery.  I talked about the steps of the procedure.  I explained that we would work through the same incision that we made for the sacrospinous ligament suspension.  I explained that plication stitches would be used to bring the lateral fascia closer together to better secure the bladder so it does not drop anteriorly into the vaginal canal.  We talked about additional risks of the procedure procedure including damage to the bladder and damage  to the urethra.    The patient also explained to me that when she pushes her vaginal prolapse and, she leaks a fair amount of urine.  I therefore explained that she was likely at high risk of having stress urinary incontinence after the apical prolapse as well as the cystocele were repaired.  I therefore talked about the option of prophylactic mid urethral sling placement.  I explained that some patients opt to not get the mid urethral sling, which is made of mesh, and follow-up their incontinence after the surgery and get the sling in the future if need be.  She stated that she did not want to do this and would rather just have it all done during the same surgery.    We therefore had a more extensive conversation about mid urethral sling placement.        Retropubic midurethral synthetic sling (RMUS). Initially introduced as a bottom-up retropubic approach in the late 1990s, the TVT sling is arguably the most widely studied anti-incontinence procedure, with data that exceeds 15 years follow up. Success rates are reported to be between 51 and 87%. The TVT has also been the subject of numerous comparative studies. Evaluations of the top down versus bottom up approach did not identify definitive superiority for one approach over the other. I explained that I prefer the top down approach based on multiple factors related to the anatomy and technical aspects of sling placement but that either are options depending on the circumstances.    Autologous fascia pubovaginal sling (PVS). I explained that the autologous fascia PVS, which involves the placement of the patient's own tissue, usually from the abdominal wall or thigh, underneath the urethra to provide support has been performed for many years. Using varying definitions, single center studies have confirmed between 87% and 92% success with 3-15 year follow up. I explained that the autologous fascia PVS is a viable option for the management of JUANA with similar  efficacy to retropubic slings. The primary trade-off is the added morbidity of the fascial harvest (rectus fascia vs. Fascia paige), often requiring an overnight hospital stay and a longer recovery relative to the midurethral sling. However, because no mesh is placed, there is little risk of sling exposure or erosion (a 3-4% risk with synthetic slings), making it a necessity in cases where the urethra has to be repaired and potentially beneficial in individuals wishing to avoid mesh. There is also a reduced risk of pain relative to mesh slings, however, retention requiring extended catheterization or intermittent catheterization, wound infection, and a prolonged recovery are more common with this approach, which has a longer surgical convalescence than mesh slings. Voiding dysfunction may also be more common.    I discussed the variable risks associated with each procedure, which include but are not limited to: bleeding; infection; injury to surrounding organs including bladder, bowel, ureters, and rectum with need for additional surgery for repair; medical complications; anesthetic complications; disability; possible graft/ mesh complications including exposure, infection, rejection, subsequent pain, and possible need for removal or revision or mesh or associated sutures [subsequent surgery(s)]; painful intercourse; and death.    I explained that although we do our best to select the optimal treatment for each patient given her individual clinical presentation, risk factors, and preferences, some women experience worsening of symptoms or new symptoms following surgery, particularly when storage symptoms (such as urgency, frequency, urge urinary incontinence) are present. Others fail to have adequate improvement. These symptoms may improve with additional procedures or medications.            PLAN  -Consented for sacrospinous ligament suspension, cystocele repair, mid urethral sling placement,  "cystoscopy  -PAT's  -Urine culture      Portions of the above record have been created with voice recognition software.  Occasional wrong word or \"sound alike\" substitution may have occurred due to the inherent limitations of voice recognition software.  Read the chart carefully and recognize, using context, where substitution may have occurred.      Theo Ortiz DO        Chief Complaint     Pelvic organ prolapse      History of Present Illness     ee summary above    No recent fevers or chills          The following portions of the patient's history were reviewed and updated as appropriate: allergies, current medications, past family history, past medical history, past social history, past surgical history and problem list.        Review of Systems     Denied fevers, chills, nausea, vomiting, shortness of breath, abdominal pain, blood in urine, painful urination    Allergies     No Known Allergies    Physical Exam     No acute distress, normocephalic atraumatic, nonlabored breathing, abdomen soft nontender, no CVA tenderness bilaterally      Vital Signs  Vitals:    07/23/24 1255   BP: 138/74   BP Location: Left arm   Patient Position: Sitting   Cuff Size: Standard   Pulse: 74   SpO2: 98%   Weight: 83.9 kg (185 lb)   Height: 5' 2\" (1.575 m)         Current Medications       Current Outpatient Medications:     escitalopram (LEXAPRO) 10 mg tablet, Take 1 tablet (10 mg total) by mouth daily at bedtime, Disp: 30 tablet, Rfl: 5    ALPRAZolam (XANAX) 0.5 mg tablet, Take 1 tablet (0.5 mg total) by mouth daily at bedtime as needed for anxiety (Patient not taking: Reported on 10/13/2023), Disp: 20 tablet, Rfl: 0    valACYclovir (VALTREX) 1,000 mg tablet, Take 1 tablet (1,000 mg total) by mouth 2 (two) times a day for 2 doses (Patient not taking: Reported on 9/15/2023), Disp: 2 tablet, Rfl: 0      Active Problems     Patient Active Problem List   Diagnosis    Atypical chest pain    Plantar fasciitis    Pain in both " "feet    Congenital pes planus of right foot    Congenital pes planus of left foot    Tinea pedis of both feet    Disorder of right eustachian tube    Obesity (BMI 30-39.9)    Abnormal LFTs    Anemia    Hyperglycemia    Primary osteoarthritis involving multiple joints    Prediabetes    Mixed hyperlipidemia    Anxiety and depression    COVID-19    Cold sore    Cystocele with prolapse    Prolapse of anterior vaginal wall         Past Medical History     Past Medical History:   Diagnosis Date    Anxiety     Psychiatric disorder     anxiety         Surgical History     Past Surgical History:   Procedure Laterality Date     SECTION      HYSTERECTOMY      MAMMO (HISTORICAL)  2022    TONSILLECTOMY           Family History     Family History   Problem Relation Age of Onset    Heart disease Mother     Heart attack Mother 57    Diabetes Mother     Diabetes Father     Heart disease Father     Atrial fibrillation Father     Snyder's esophagus Sister     Diabetes Maternal Grandmother     Diabetes Maternal Grandfather          Social History     Social History     Social History     Tobacco Use   Smoking Status Light Smoker    Types: E-Cigarettes   Smokeless Tobacco Never   Tobacco Comments    vape         Pertinent Lab Values     Lab Results   Component Value Date    CREATININE 0.51 2023       No results found for: \"PSA\"        Pertinent Imaging     N/A      Pertinent Pathology     N/A        I have spent 45 minutes with Patient  today in which greater than 50% of this time was spent in counseling/coordination of care regarding Diagnostic results, Prognosis, Risks and benefits of tx options, Instructions for management, Patient and family education, Importance of tx compliance, Impressions, Counseling / Coordination of care, Documenting in the medical record, Reviewing / ordering tests, medicine, procedures  , and Obtaining or reviewing history  .    Please note this time includes cumulative time on the day " of encounter, including reviewing medical records and/or coordinating care among the patient's other specialists.

## 2024-07-25 NOTE — PROGRESS NOTES
Tried calling patient teams would not go threw will try back later    no Goals of care, counseling/discussion Post-polio syndrome

## 2024-07-30 NOTE — TELEPHONE ENCOUNTER
Joe from [facility] called stated that pt was seen back in October and was told that her visit will be covered but now she received a bill for her visit and it is not covered.   [facility]    Joe called to speak with someone regarding if her visit was covered or not.     Please review     Joe can be reached at [phone]      
30-Jul-2024 23:08

## 2024-08-06 ENCOUNTER — TELEPHONE (OUTPATIENT)
Age: 59
End: 2024-08-06

## 2024-10-18 ENCOUNTER — OFFICE VISIT (OUTPATIENT)
Dept: INTERNAL MEDICINE CLINIC | Facility: CLINIC | Age: 59
End: 2024-10-18
Payer: COMMERCIAL

## 2024-10-18 VITALS
HEIGHT: 62 IN | OXYGEN SATURATION: 95 % | SYSTOLIC BLOOD PRESSURE: 136 MMHG | BODY MASS INDEX: 34.04 KG/M2 | WEIGHT: 185 LBS | DIASTOLIC BLOOD PRESSURE: 80 MMHG | HEART RATE: 78 BPM

## 2024-10-18 DIAGNOSIS — Z11.4 SCREENING FOR HIV (HUMAN IMMUNODEFICIENCY VIRUS): ICD-10-CM

## 2024-10-18 DIAGNOSIS — F32.A ANXIETY AND DEPRESSION: ICD-10-CM

## 2024-10-18 DIAGNOSIS — F17.201 TOBACCO DEPENDENCE IN REMISSION: ICD-10-CM

## 2024-10-18 DIAGNOSIS — E78.2 MIXED HYPERLIPIDEMIA: ICD-10-CM

## 2024-10-18 DIAGNOSIS — Z00.00 ANNUAL PHYSICAL EXAM: Primary | ICD-10-CM

## 2024-10-18 DIAGNOSIS — Z12.31 ENCOUNTER FOR SCREENING MAMMOGRAM FOR MALIGNANT NEOPLASM OF BREAST: ICD-10-CM

## 2024-10-18 DIAGNOSIS — E66.811 CLASS 1 OBESITY DUE TO EXCESS CALORIES WITH BODY MASS INDEX (BMI) OF 33.0 TO 33.9 IN ADULT, UNSPECIFIED WHETHER SERIOUS COMORBIDITY PRESENT: ICD-10-CM

## 2024-10-18 DIAGNOSIS — Z23 NEED FOR VACCINATION: ICD-10-CM

## 2024-10-18 DIAGNOSIS — E66.09 CLASS 1 OBESITY DUE TO EXCESS CALORIES WITH BODY MASS INDEX (BMI) OF 33.0 TO 33.9 IN ADULT, UNSPECIFIED WHETHER SERIOUS COMORBIDITY PRESENT: ICD-10-CM

## 2024-10-18 DIAGNOSIS — Z13.6 SCREENING FOR CARDIOVASCULAR CONDITION: ICD-10-CM

## 2024-10-18 DIAGNOSIS — F41.9 ANXIETY AND DEPRESSION: ICD-10-CM

## 2024-10-18 DIAGNOSIS — Z11.59 NEED FOR HEPATITIS C SCREENING TEST: ICD-10-CM

## 2024-10-18 PROCEDURE — 90471 IMMUNIZATION ADMIN: CPT

## 2024-10-18 PROCEDURE — 99396 PREV VISIT EST AGE 40-64: CPT

## 2024-10-18 PROCEDURE — 90673 RIV3 VACCINE NO PRESERV IM: CPT

## 2024-10-18 RX ORDER — ESCITALOPRAM OXALATE 10 MG/1
10 TABLET ORAL
Qty: 30 TABLET | Refills: 3 | Status: SHIPPED | OUTPATIENT
Start: 2024-10-18 | End: 2024-10-22 | Stop reason: SDUPTHER

## 2024-10-18 RX ORDER — ALPRAZOLAM 0.5 MG
0.5 TABLET ORAL DAILY PRN
Qty: 30 TABLET | Refills: 0 | Status: SHIPPED | OUTPATIENT
Start: 2024-10-18

## 2024-10-18 NOTE — ASSESSMENT & PLAN NOTE
Depression Screening Follow-up Plan: Patient's depression screening was positive with a PHQ-9 score of 5.     Orders:    ALPRAZolam (XANAX) 0.5 mg tablet; Take 1 tablet (0.5 mg total) by mouth daily as needed for anxiety

## 2024-10-18 NOTE — PROGRESS NOTES
Adult Annual Physical  Name: Norma Sanchez      : 1965      MRN: [id number]  Encounter Provider: Tea Medel MD  Encounter Date: 10/18/2024   Encounter department: [facility]    Assessment & Plan  Annual physical exam         Anxiety and depression  Depression Screening Follow-up Plan: Patient's depression screening was positive with a PHQ-9 score of 5.     Orders:    ALPRAZolam (XANAX) 0.5 mg tablet; Take 1 tablet (0.5 mg total) by mouth daily as needed for anxiety    Class 1 obesity due to excess calories with body mass index (BMI) of 33.0 to 33.9 in adult, unspecified whether serious comorbidity present      Orders:    Lipid Panel with Direct LDL reflex; Future    Hemoglobin A1C; Future    Semaglutide-Weight Management (WEGOVY) 0.25 MG/0.5ML; Inject 0.5 mL (0.25 mg total) under the skin once a week for 28 days    Semaglutide-Weight Management (WEGOVY) 0.5 MG/0.5ML; Inject 0.5 mL (0.5 mg total) under the skin once a week for 28 days Do not start before 2024.    Semaglutide-Weight Management (WEGOVY) 1 MG/0.5ML; Inject 0.5 mL (1 mg total) under the skin once a week for 28 days Do not start before 2024.    Semaglutide-Weight Management (WEGOVY) 1.7 MG/0.75ML; Inject 0.75 mL (1.7 mg total) under the skin once a week for 28 days Do not start before 2025.    Semaglutide-Weight Management (WEGOVY) 2.4 MG/0.75ML; Inject 0.75 mL (2.4 mg total) under the skin once a week for 28 days Do not start before 2025.    Lipid Panel with Direct LDL reflex    Mixed hyperlipidemia    Orders:    Lipid Panel with Direct LDL reflex; Future    Lipid Panel with Direct LDL reflex    Need for vaccination    Orders:    influenza vaccine, recombinant, PF, 0.5 mL IM (Flublok)    Screening for cardiovascular condition    Orders:    Lipid Panel with Direct LDL reflex; Future    Hemoglobin A1C; Future    Comprehensive metabolic panel; Future    Lipid Panel  with Direct LDL reflex    Comprehensive metabolic panel    Encounter for screening mammogram for malignant neoplasm of breast    Orders:    Mammo screening bilateral w 3d and cad; Future    Need for hepatitis C screening test    Orders:    Hepatitis C Antibody; Future    Screening for HIV (human immunodeficiency virus)    Orders:    HIV 1/2 AG/AB w Reflex [facility] for 2 yr old and above; Future    Tobacco dependence in remission  - pt reports quit smoking 8 years ago  - 30 pack year smoking history  - declines screening for lung cancer       Immunizations and preventive care screenings were discussed with patient today. Appropriate education was printed on patient's after visit summary.    Counseling:  Alcohol/drug use: discussed moderation in alcohol intake, the recommendations for healthy alcohol use, and avoidance of illicit drug use.  Dental Health: discussed importance of regular tooth brushing, flossing, and dental visits.  Injury prevention: discussed safety/seat belts, safety helmets, smoke detectors, carbon monoxide detectors, and smoking near bedding or upholstery.  Sexual health: discussed sexually transmitted diseases, partner selection, use of condoms, avoidance of unintended pregnancy, and contraceptive alternatives.  Exercise: the importance of regular exercise/physical activity was discussed. Recommend exercise 3-5 times per week for at least 30 minutes.       Depression Screening and Follow-up Plan: Patient's depression screening was positive with a PHQ-9 score of 5. Patient with underlying depression and was advised to continue current medications as prescribed.       Return in 3 months (on 1/18/2025) for Recheck weight loss.    History of Present Illness       Adult Annual Physical:  Patient presents for annual physical.     Diet and Physical Activity:  - Diet/Nutrition: limited junk food.  - Exercise: no formal exercise. 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  - Hearing: normal hearing bilateral ears.  - Vision: goes for regular eye exams.  - Dental: regular dental visits and brushes teeth twice daily.    /GYN Health:  - Follows with GYN: no.   - Menopause: postmenopausal.   - Contraception:. Total hysterectomy        Review of Systems   Constitutional:  Negative for chills and fever.   HENT:  Negative for congestion and rhinorrhea.    Eyes:  Negative for visual disturbance.   Respiratory:  Negative for shortness of breath.    Cardiovascular:  Negative for chest pain.   Gastrointestinal:  Negative for abdominal pain.   Genitourinary:  Negative for dysuria.   Musculoskeletal:  Negative for arthralgias.   Skin:  Negative for rash.   Neurological:  Negative for dizziness and headaches.   Psychiatric/Behavioral:  Negative for confusion, dysphoric mood and sleep disturbance. The patient is not nervous/anxious.        Pertinent Medical History         Medical History Reviewed by provider this encounter:  Tobacco  Allergies  Meds  Problems  Med Hx  Surg Hx  Fam Hx       Past Medical History   Past Medical History:   Diagnosis Date    Anxiety     Psychiatric disorder     anxiety     Past Surgical History:   Procedure Laterality Date     SECTION      HYSTERECTOMY      MAMMO (HISTORICAL)  2022    TONSILLECTOMY       Family History   Problem Relation Age of Onset    Heart disease Mother     Heart attack Mother 57    Diabetes Mother     Diabetes Father     Heart disease Father     Atrial fibrillation Father     Snyder's esophagus Sister     Diabetes Maternal Grandmother     Diabetes Maternal Grandfather      Current Outpatient Medications on File Prior to Visit   Medication Sig Dispense Refill    [DISCONTINUED] ALPRAZolam (XANAX) 0.5 mg tablet Take 1 tablet (0.5 mg total) by mouth daily at bedtime as needed for anxiety 20 tablet 0    [DISCONTINUED] escitalopram (LEXAPRO) 10 mg tablet  "Take 1 tablet (10 mg total) by mouth daily at bedtime 30 tablet 5    [DISCONTINUED] valACYclovir (VALTREX) 1,000 mg tablet Take 1 tablet (1,000 mg total) by mouth 2 (two) times a day for 2 doses (Patient not taking: Reported on 9/15/2023) 2 tablet 0     No current facility-administered medications on file prior to visit.   No Known Allergies   Current Outpatient Medications on File Prior to Visit   Medication Sig Dispense Refill    [DISCONTINUED] ALPRAZolam (XANAX) 0.5 mg tablet Take 1 tablet (0.5 mg total) by mouth daily at bedtime as needed for anxiety 20 tablet 0    [DISCONTINUED] escitalopram (LEXAPRO) 10 mg tablet Take 1 tablet (10 mg total) by mouth daily at bedtime 30 tablet 5    [DISCONTINUED] valACYclovir (VALTREX) 1,000 mg tablet Take 1 tablet (1,000 mg total) by mouth 2 (two) times a day for 2 doses (Patient not taking: Reported on 9/15/2023) 2 tablet 0     No current facility-administered medications on file prior to visit.      Social History     Tobacco Use    Smoking status: Former     Types: E-Cigarettes     Quit date:      Years since quittin.8    Smokeless tobacco: Never    Tobacco comments:     vape   Vaping Use    Vaping status: Every Day    Substances: Nicotine   Substance and Sexual Activity    Alcohol use: No    Drug use: No    Sexual activity: Yes     Partners: Male     Birth control/protection: None       Objective     /80   Pulse 78   Ht 5' 2\" (1.575 m)   Wt 83.9 kg (185 lb)   SpO2 95%   BMI 33.84 kg/m²     Physical Exam  Vitals and nursing note reviewed.   Constitutional:       General: She is not in acute distress.     Appearance: Normal appearance. She is obese. She is not ill-appearing, toxic-appearing or diaphoretic.   HENT:      Head: Normocephalic and atraumatic.      Right Ear: Tympanic membrane, ear canal and external ear normal. There is no impacted cerumen.      Left Ear: Tympanic membrane, ear canal and external ear normal. There is no impacted cerumen.      " Nose: No congestion.      Mouth/Throat:      Mouth: Mucous membranes are moist.      Pharynx: Oropharynx is clear. No posterior oropharyngeal erythema.   Eyes:      General:         Right eye: No discharge.         Left eye: No discharge.      Extraocular Movements: Extraocular movements intact.      Conjunctiva/sclera: Conjunctivae normal.   Cardiovascular:      Rate and Rhythm: Normal rate and regular rhythm.      Pulses: Normal pulses.      Heart sounds: Normal heart sounds.   Pulmonary:      Effort: Pulmonary effort is normal. No respiratory distress.      Breath sounds: Normal breath sounds.   Abdominal:      Palpations: Abdomen is soft.      Tenderness: There is no abdominal tenderness.   Musculoskeletal:         General: Normal range of motion.      Cervical back: Normal range of motion.      Right lower leg: No edema.      Left lower leg: No edema.   Skin:     General: Skin is warm and dry.   Neurological:      Mental Status: She is alert and oriented to person, place, and time.   Psychiatric:         Mood and Affect: Mood normal.         Behavior: Behavior normal.

## 2024-10-18 NOTE — PATIENT INSTRUCTIONS
"Patient Education     Routine physical for adults   The Basics   Written by the doctors and editors at [facility]   What is a physical? -- A physical is a routine visit, or \"check-up,\" with your doctor. You might also hear it called a \"wellness visit\" or \"preventive visit.\"  During each visit, the doctor will:   Ask about your physical and mental health   Ask about your habits, behaviors, and lifestyle   Do an exam   Give you vaccines if needed   Talk to you about any medicines you take   Give advice about your health   Answer your questions  Getting regular check-ups is an important part of taking care of your health. It can help your doctor find and treat any problems you have. But it's also important for preventing health problems.  A routine physical is different from a \"sick visit.\" A sick visit is when you see a doctor because of a health concern or problem. Since physicals are scheduled ahead of time, you can think about what you want to ask the doctor.  How often should I get a physical? -- It depends on your age and health. In general, for people age 21 years and older:   If you are younger than 50 years, you might be able to get a physical every 3 years.   If you are 50 years or older, your doctor might recommend a physical every year.  If you have an ongoing health condition, like diabetes or high blood pressure, your doctor will probably want to see you more often.  What happens during a physical? -- In general, each visit will include:   Physical exam - The doctor or nurse will check your height, weight, heart rate, and blood pressure. They will also look at your eyes and ears. They will ask about how you are feeling and whether you have any symptoms that bother you.   Medicines - It's a good idea to bring a list of all the medicines you take to each doctor visit. Your doctor will talk to you about your medicines and answer any questions. Tell them if you are having any side effects that bother you. You " "should also tell them if you are having trouble paying for any of your medicines.   Habits and behaviors - This includes:   Your diet   Your exercise habits   Whether you smoke, drink alcohol, or use drugs   Whether you are sexually active   Whether you feel safe at home  Your doctor will talk to you about things you can do to improve your health and lower your risk of health problems. They will also offer help and support. For example, if you want to quit smoking, they can give you advice and might prescribe medicines. If you want to improve your diet or get more physical activity, they can help you with this, too.   Lab tests, if needed - The tests you get will depend on your age and situation. For example, your doctor might want to check your:   Cholesterol   Blood sugar   Iron level   Vaccines - The recommended vaccines will depend on your age, health, and what vaccines you already had. Vaccines are very important because they can prevent certain serious or deadly infections.   Discussion of screening - \"Screening\" means checking for diseases or other health problems before they cause symptoms. Your doctor can recommend screening based on your age, risk, and preferences. This might include tests to check for:   Cancer, such as breast, prostate, cervical, ovarian, colorectal, prostate, lung, or skin cancer   Sexually transmitted infections, such as chlamydia and gonorrhea   Mental health conditions like depression and anxiety  Your doctor will talk to you about the different types of screening tests. They can help you decide which screenings to have. They can also explain what the results might mean.   Answering questions - The physical is a good time to ask the doctor or nurse questions about your health. If needed, they can refer you to other doctors or specialists, too.  Adults older than 65 years often need other care, too. As you get older, your doctor will talk to you about:   How to prevent falling at " home   Hearing or vision tests   Memory testing   How to take your medicines safely   Making sure that you have the help and support you need at home  All topics are updated as new evidence becomes available and our peer review process is complete.  This topic retrieved from [employer] on: May 02, 2024.  Topic 459527 Version 1.0  Release: 32.4.3 - C32.122  © 2024 UpToDate, Inc. and/or its affiliates. All rights reserved.  Consumer Information Use and Disclaimer   Disclaimer: This generalized information is a limited summary of diagnosis, treatment, and/or medication information. It is not meant to be comprehensive and should be used as a tool to help the user understand and/or assess potential diagnostic and treatment options. It does NOT include all information about conditions, treatments, medications, side effects, or risks that may apply to a specific patient. It is not intended to be medical advice or a substitute for the medical advice, diagnosis, or treatment of a health care provider based on the health care provider's examination and assessment of a patient's specific and unique circumstances. Patients must speak with a health care provider for complete information about their health, medical questions, and treatment options, including any risks or benefits regarding use of medications. This information does not endorse any treatments or medications as safe, effective, or approved for treating a specific patient. UpToDate, Inc. and its affiliates disclaim any warranty or liability relating to this information or the use thereof.The use of this information is governed by the Terms of Use, available at https://www.woltersthe Shelfuwer.com/en/know/clinical-effectiveness-terms. 2024© UpToDate, Inc. and its affiliates and/or licensors. All rights reserved.  Copyright   © 2024 UpToDate, Inc. and/or its affiliates. All rights reserved.

## 2024-10-21 ENCOUNTER — TELEPHONE (OUTPATIENT)
Age: 59
End: 2024-10-21

## 2024-10-21 NOTE — TELEPHONE ENCOUNTER
PA for Semaglutide-Weight Management (WEGOVY) 0.25 MG/0.5ML SUBMITTED     via    [x]CM-KEY: [id number]  []Surescripts-Case ID #   []Availity-Auth ID # NDC #   []Faxed to plan   []Other website   []Phone call Case ID #     Office notes sent, clinical questions answered. Awaiting determination    Turnaround time for your insurance to make a decision on your Prior Authorization can take 7-21 business days.

## 2024-10-21 NOTE — TELEPHONE ENCOUNTER
Norma called in stated that her pharmacy has her medication for Wegovy. They called her stated that it needs pre authorization   . Please Advise Thank you   Semaglutide-Weight Management (WEGOVY) 0.25 MG/0.5ML

## 2024-10-22 DIAGNOSIS — F41.9 ANXIETY AND DEPRESSION: ICD-10-CM

## 2024-10-22 DIAGNOSIS — F32.A ANXIETY AND DEPRESSION: ICD-10-CM

## 2024-10-22 RX ORDER — ESCITALOPRAM OXALATE 10 MG/1
10 TABLET ORAL
Qty: 30 TABLET | Refills: 3 | Status: SHIPPED | OUTPATIENT
Start: 2024-10-22

## 2024-10-22 NOTE — TELEPHONE ENCOUNTER
Patient called that she needs her Lexapro sent to local pharmacy [facility] due to termination of Rx plan.

## 2024-10-24 NOTE — TELEPHONE ENCOUNTER
PA for Semaglutide-Weight Management (WEGOVY) 0.25 MG/0.5ML CLOSED     If pt has new /  updated rx insurance please update in chart, thank you

## 2024-11-19 DIAGNOSIS — F41.9 ANXIETY AND DEPRESSION: ICD-10-CM

## 2024-11-19 DIAGNOSIS — F32.A ANXIETY AND DEPRESSION: ICD-10-CM

## 2024-11-19 RX ORDER — ESCITALOPRAM OXALATE 10 MG/1
10 TABLET ORAL
Qty: 30 TABLET | Refills: 3 | Status: SHIPPED | OUTPATIENT
Start: 2024-11-19

## 2024-11-19 NOTE — TELEPHONE ENCOUNTER
Reason for call:   [x] Refill   [] Prior Auth  [] Other:     Office:   [x] PCP/Provider - Magdalena  [] Specialty/Provider -     Medication: Escitalopram 10mg    Dose/Frequency: 1 tab hs    Quantity: 90    Pharmacy: [facility] [place] - [address] [place], [place] [address] [phone]     Does the patient have enough for 3 days?   [] Yes   [x] No - Send as HP to POD

## 2024-11-20 ENCOUNTER — TELEPHONE (OUTPATIENT)
Dept: UROLOGY | Facility: CLINIC | Age: 59
End: 2024-11-20

## 2024-11-20 LAB
ALBUMIN SERPL-MCNC: 4.4 G/DL (ref 3.8–4.9)
ALP SERPL-CCNC: 51 IU/L (ref 44–121)
ALT SERPL-CCNC: 22 IU/L (ref 0–32)
AST SERPL-CCNC: 25 IU/L (ref 0–40)
BILIRUB SERPL-MCNC: 0.3 MG/DL (ref 0–1.2)
BUN SERPL-MCNC: 10 MG/DL (ref 6–24)
BUN/CREAT SERPL: 20 (ref 9–23)
CALCIUM SERPL-MCNC: 9.6 MG/DL (ref 8.7–10.2)
CHLORIDE SERPL-SCNC: 103 MMOL/L (ref 96–106)
CHOLEST SERPL-MCNC: 244 MG/DL (ref 100–199)
CO2 SERPL-SCNC: 24 MMOL/L (ref 20–29)
CREAT SERPL-MCNC: 0.49 MG/DL (ref 0.57–1)
EGFR: 110 ML/MIN/1.73
GLOBULIN SER-MCNC: 2.7 G/DL (ref 1.5–4.5)
GLUCOSE SERPL-MCNC: 81 MG/DL (ref 70–99)
HBA1C MFR BLD: 6 % (ref 4.8–5.6)
HCV AB S/CO SERPL IA: NON REACTIVE
HDLC SERPL-MCNC: 51 MG/DL
HIV 1+2 AB+HIV1 P24 AG SERPL QL IA: NON REACTIVE
LDLC SERPL CALC-MCNC: 169 MG/DL (ref 0–99)
LDLC/HDLC SERPL: 3.3 RATIO (ref 0–3.2)
POTASSIUM SERPL-SCNC: 4.4 MMOL/L (ref 3.5–5.2)
PROT SERPL-MCNC: 7.1 G/DL (ref 6–8.5)
SL AMB VLDL CHOLESTEROL CALC: 24 MG/DL (ref 5–40)
SODIUM SERPL-SCNC: 142 MMOL/L (ref 134–144)
TRIGL SERPL-MCNC: 135 MG/DL (ref 0–149)

## 2024-11-22 ENCOUNTER — RESULTS FOLLOW-UP (OUTPATIENT)
Dept: INTERNAL MEDICINE CLINIC | Facility: CLINIC | Age: 59
End: 2024-11-22

## 2024-11-25 NOTE — TELEPHONE ENCOUNTER
----- Message from Tea Medel MD sent at 11/22/2024  5:18 PM EST -----  Please call and assist the patient in scheduling an in office appointment to discuss abnormal labs and request for weight management medication. Thanks!

## 2024-12-04 ENCOUNTER — TELEPHONE (OUTPATIENT)
Dept: OTHER | Facility: OTHER | Age: 59
End: 2024-12-04

## 2024-12-04 ENCOUNTER — OFFICE VISIT (OUTPATIENT)
Dept: INTERNAL MEDICINE CLINIC | Facility: CLINIC | Age: 59
End: 2024-12-04
Payer: COMMERCIAL

## 2024-12-04 VITALS
RESPIRATION RATE: 16 BRPM | HEART RATE: 66 BPM | SYSTOLIC BLOOD PRESSURE: 120 MMHG | TEMPERATURE: 99.1 F | DIASTOLIC BLOOD PRESSURE: 70 MMHG | HEIGHT: 62 IN | BODY MASS INDEX: 33.84 KG/M2

## 2024-12-04 DIAGNOSIS — E66.09 CLASS 1 OBESITY DUE TO EXCESS CALORIES WITH BODY MASS INDEX (BMI) OF 33.0 TO 33.9 IN ADULT, UNSPECIFIED WHETHER SERIOUS COMORBIDITY PRESENT: Primary | ICD-10-CM

## 2024-12-04 DIAGNOSIS — E66.811 CLASS 1 OBESITY DUE TO EXCESS CALORIES WITH BODY MASS INDEX (BMI) OF 33.0 TO 33.9 IN ADULT, UNSPECIFIED WHETHER SERIOUS COMORBIDITY PRESENT: Primary | ICD-10-CM

## 2024-12-04 DIAGNOSIS — E78.2 MIXED HYPERLIPIDEMIA: ICD-10-CM

## 2024-12-04 DIAGNOSIS — R73.03 PREDIABETES: ICD-10-CM

## 2024-12-04 DIAGNOSIS — N81.4 CYSTOCELE WITH PROLAPSE: ICD-10-CM

## 2024-12-04 PROCEDURE — 99214 OFFICE O/P EST MOD 30 MIN: CPT

## 2024-12-04 RX ORDER — PRAVASTATIN SODIUM 10 MG
10 TABLET ORAL DAILY
Qty: 30 TABLET | Refills: 0 | Status: SHIPPED | OUTPATIENT
Start: 2024-12-04

## 2024-12-04 RX ORDER — ROSUVASTATIN CALCIUM 5 MG/1
5 TABLET, COATED ORAL DAILY
Qty: 30 TABLET | Refills: 5 | Status: SHIPPED | OUTPATIENT
Start: 2024-12-04 | End: 2024-12-04

## 2024-12-04 NOTE — PROGRESS NOTES
Name: Norma Sanchez      : 1965      MRN: [id number]  Encounter Provider: Tea Medel MD  Encounter Date: 2024   Encounter department: [facility] INTERNAL MEDICINE  :  Assessment & Plan  Class 1 obesity due to excess calories with body mass index (BMI) of 33.0 to 33.9 in adult, unspecified whether serious comorbidity present  Prior Auth for Wegovy was declined  Referral to weight management to discuss other options    Orders:    Ambulatory Referral to Weight Management; Future    Cystocele with prolapse  Previously following with urology--but treatment plan stalled  Will refer to new urogynecology provider for continued management  Orders:    Ambulatory Referral to Urogynecology; Future    Mixed hyperlipidemia  Lab Results   Component Value Date    CHOLESTEROL 244 (H) 2024    TRIG 135 2024    HDL 51 2024    LDLCALC 169 (H) 2024     Worsened from 1 year ago  Previously started on atorvastatin but discontinued due to A/E myalgia  Strong Fhx of heart disease with sudden cardiac death in her mom @ 56  Cardiac workup with stress test completed for the patient in 2018 with no abnormal findings  Patient remains asymptomatic  Trial of lifestyle modifications alone not successful--- pt would benefit from statin therapy for cholesterol lowering and anti-inflammatory  Start CoQ10 supplement to help prevent myalgia  Start pravastatin 10 mg daily  Repeat lipid panel in 3 months  Orders:    Lipid panel; Future    pravastatin (PRAVACHOL) 10 mg tablet; Take 1 tablet (10 mg total) by mouth daily    Prediabetes  Lab Results   Component Value Date    HGBA1C 6.0 (H) 2024    HGBA1C 5.5 2023       Advised to implement low carb diet  Recommend incorporating a more whole foods plant-predominant diet along with decreasing consumption of red meats and processed foods  Per AHA guidelines, recommend moderate-vigorous intensity exercise for 30 minutes a day for 5 days a week or  a total of 150 min/week    Orders:    Hemoglobin A1C; Future    Return in about 3 months (around 3/4/2025) for Recheck chronic medical conditions.       History of Present Illness     HPI  Patient returns for follow-up visit to review labs and for continued management of chronic medical conditions.  Labs show worsening of cholesterol levels and new diagnosis of prediabetes.     Continues to endorse poor bladder control symptoms with diagnosis of cystocele with prolapse of anterior vaginal wall.  Was previously following with a urologist with plans to undergo a procedure, however she has not heard back from them in quite some time.    Continues to struggle with weight management issues.  She reports valiant attempt in dietary modifications as well as exercise but still has difficulty losing weight.  Of note, while working, the patient has limited time to eat well-balanced meals and often only eats 1 meal per day.     She continues to deal with symptoms of anxiety--but notable improvement on Lexapro and Xanax.     Review of Systems   Constitutional:  Negative for chills and fever.   HENT:  Negative for congestion and rhinorrhea.    Eyes:  Negative for visual disturbance.   Respiratory:  Negative for shortness of breath.    Cardiovascular:  Negative for chest pain.   Gastrointestinal:  Negative for abdominal pain.   Genitourinary:  Negative for dysuria.   Musculoskeletal:  Negative for arthralgias.   Skin:  Negative for rash.   Neurological:  Negative for dizziness and headaches.   Psychiatric/Behavioral:  Negative for confusion, dysphoric mood and sleep disturbance. The patient is nervous/anxious.          Medical History Reviewed by provider this encounter:  Tobacco  Allergies  Meds  Problems  Med Hx  Surg Hx  Fam Hx     .  Past Medical History   Past Medical History:   Diagnosis Date    Anxiety     Psychiatric disorder     anxiety     Past Surgical History:   Procedure Laterality Date     SECTION       HYSTERECTOMY      MAMMO (HISTORICAL)  05/17/2022    TONSILLECTOMY       Family History   Problem Relation Age of Onset    Heart disease Mother     Heart attack Mother 57    Diabetes Mother     Diabetes Father     Heart disease Father     Atrial fibrillation Father     Snyder's esophagus Sister     Diabetes Maternal Grandmother     Diabetes Maternal Grandfather       reports that she quit smoking about 8 years ago. Her smoking use included e-cigarettes. She has never used smokeless tobacco. She reports that she does not drink alcohol and does not use drugs.  Current Outpatient Medications on File Prior to Visit   Medication Sig Dispense Refill    ALPRAZolam (XANAX) 0.5 mg tablet TAKE 1 TABLET BY MOUTH ONCE DAILY AS NEEDED FOR ANXIETY 30 tablet 0    escitalopram (LEXAPRO) 10 mg tablet Take 1 tablet (10 mg total) by mouth daily at bedtime 30 tablet 3    Semaglutide-Weight Management (WEGOVY) 0.5 MG/0.5ML Inject 0.5 mL (0.5 mg total) under the skin once a week for 28 days Do not start before November 13, 2024. 2 mL 0    [START ON 12/11/2024] Semaglutide-Weight Management (WEGOVY) 1 MG/0.5ML Inject 0.5 mL (1 mg total) under the skin once a week for 28 days Do not start before December 11, 2024. 2 mL 0    [START ON 1/8/2025] Semaglutide-Weight Management (WEGOVY) 1.7 MG/0.75ML Inject 0.75 mL (1.7 mg total) under the skin once a week for 28 days Do not start before January 8, 2025. 3 mL 0    [START ON 2/5/2025] Semaglutide-Weight Management (WEGOVY) 2.4 MG/0.75ML Inject 0.75 mL (2.4 mg total) under the skin once a week for 28 days Do not start before February 5, 2025. 3 mL 0     No current facility-administered medications on file prior to visit.   No Known Allergies   Current Outpatient Medications on File Prior to Visit   Medication Sig Dispense Refill    ALPRAZolam (XANAX) 0.5 mg tablet TAKE 1 TABLET BY MOUTH ONCE DAILY AS NEEDED FOR ANXIETY 30 tablet 0    escitalopram (LEXAPRO) 10 mg tablet Take 1 tablet (10 mg total)  "by mouth daily at bedtime 30 tablet 3    Semaglutide-Weight Management (WEGOVY) 0.5 MG/0.5ML Inject 0.5 mL (0.5 mg total) under the skin once a week for 28 days Do not start before 2024. 2 mL 0    [START ON 2024] Semaglutide-Weight Management (WEGOVY) 1 MG/0.5ML Inject 0.5 mL (1 mg total) under the skin once a week for 28 days Do not start before 2024. 2 mL 0    [START ON 2025] Semaglutide-Weight Management (WEGOVY) 1.7 MG/0.75ML Inject 0.75 mL (1.7 mg total) under the skin once a week for 28 days Do not start before 2025. 3 mL 0    [START ON 2025] Semaglutide-Weight Management (WEGOVY) 2.4 MG/0.75ML Inject 0.75 mL (2.4 mg total) under the skin once a week for 28 days Do not start before 2025. 3 mL 0     No current facility-administered medications on file prior to visit.      Social History     Tobacco Use    Smoking status: Former     Types: E-Cigarettes     Quit date: 2016     Years since quittin.9    Smokeless tobacco: Never    Tobacco comments:     vape   Vaping Use    Vaping status: Every Day    Substances: Nicotine   Substance and Sexual Activity    Alcohol use: No    Drug use: No    Sexual activity: Yes     Partners: Male     Birth control/protection: None        Objective   /70   Pulse 66   Temp 99.1 °F (37.3 °C)   Resp 16   Ht 5' 2\" (1.575 m)   BMI 33.84 kg/m²      Physical Exam  Vitals and nursing note reviewed.   Constitutional:       General: She is not in acute distress.     Appearance: Normal appearance. She is obese. She is not ill-appearing or toxic-appearing.   HENT:      Head: Normocephalic.   Eyes:      General:         Right eye: No discharge.         Left eye: No discharge.      Extraocular Movements: Extraocular movements intact.      Conjunctiva/sclera: Conjunctivae normal.   Cardiovascular:      Rate and Rhythm: Normal rate.      Pulses: Normal pulses.   Pulmonary:      Effort: Pulmonary effort is normal. No " respiratory distress.   Musculoskeletal:         General: Normal range of motion.      Cervical back: Normal range of motion.   Skin:     General: Skin is warm and dry.   Neurological:      Mental Status: She is alert and oriented to person, place, and time.   Psychiatric:         Mood and Affect: Mood is anxious.         Behavior: Behavior normal.

## 2024-12-04 NOTE — ASSESSMENT & PLAN NOTE
Lab Results   Component Value Date    CHOLESTEROL 244 (H) 11/19/2024    TRIG 135 11/19/2024    HDL 51 11/19/2024    LDLCALC 169 (H) 11/19/2024     Worsened from 1 year ago  Previously started on atorvastatin but discontinued due to A/E myalgia  Strong Fhx of heart disease with sudden cardiac death in her mom @ 58  Cardiac workup with stress test completed for the patient in 2018 with no abnormal findings  Patient remains asymptomatic  Trial of lifestyle modifications alone not successful--- pt would benefit from statin therapy for cholesterol lowering and anti-inflammatory  Start CoQ10 supplement to help prevent myalgia  Start pravastatin 10 mg daily  Repeat lipid panel in 3 months  Orders:    Lipid panel; Future    pravastatin (PRAVACHOL) 10 mg tablet; Take 1 tablet (10 mg total) by mouth daily

## 2024-12-04 NOTE — PATIENT INSTRUCTIONS
Start taking CoQ10 supplements to prevent muscle cramps from -statin meds  Start taking rosuvastatin (Crestor) 5 mg daily (low risk for causing muscle cramps)

## 2024-12-04 NOTE — ASSESSMENT & PLAN NOTE
Lab Results   Component Value Date    HGBA1C 6.0 (H) 11/19/2024    HGBA1C 5.5 09/21/2023       Advised to implement low carb diet  Recommend incorporating a more whole foods plant-predominant diet along with decreasing consumption of red meats and processed foods  Per AHA guidelines, recommend moderate-vigorous intensity exercise for 30 minutes a day for 5 days a week or a total of 150 min/week    Orders:    Hemoglobin A1C; Future

## 2024-12-04 NOTE — TELEPHONE ENCOUNTER
Patient called saying that she just received a text message from the pharmacy stated that her insurance is not covering the medication the doctor had prescribed her and is asking if the doctor can prescribe something else. Patient is asking if the office can give her a back a call regarding the matter.

## 2024-12-04 NOTE — ASSESSMENT & PLAN NOTE
Previously following with urology--but treatment plan stalled  Will refer to new urogynecology provider for continued management  Orders:    Ambulatory Referral to Urogynecology; Future

## 2025-01-10 DIAGNOSIS — E78.2 MIXED HYPERLIPIDEMIA: ICD-10-CM

## 2025-01-11 RX ORDER — PRAVASTATIN SODIUM 10 MG
10 TABLET ORAL DAILY
Qty: 30 TABLET | Refills: 0 | Status: SHIPPED | OUTPATIENT
Start: 2025-01-11

## 2025-02-04 ENCOUNTER — OFFICE VISIT (OUTPATIENT)
Dept: BARIATRICS | Facility: CLINIC | Age: 60
End: 2025-02-04
Payer: COMMERCIAL

## 2025-02-04 VITALS
HEIGHT: 62 IN | BODY MASS INDEX: 34.6 KG/M2 | HEART RATE: 84 BPM | WEIGHT: 188 LBS | OXYGEN SATURATION: 98 % | SYSTOLIC BLOOD PRESSURE: 128 MMHG | DIASTOLIC BLOOD PRESSURE: 70 MMHG

## 2025-02-04 DIAGNOSIS — E66.09 CLASS 1 OBESITY DUE TO EXCESS CALORIES WITH BODY MASS INDEX (BMI) OF 33.0 TO 33.9 IN ADULT, UNSPECIFIED WHETHER SERIOUS COMORBIDITY PRESENT: ICD-10-CM

## 2025-02-04 DIAGNOSIS — E66.09 CLASS 1 OBESITY DUE TO EXCESS CALORIES WITH SERIOUS COMORBIDITY AND BODY MASS INDEX (BMI) OF 34.0 TO 34.9 IN ADULT: Primary | ICD-10-CM

## 2025-02-04 DIAGNOSIS — R94.31 EKG ABNORMALITIES: ICD-10-CM

## 2025-02-04 DIAGNOSIS — E66.811 CLASS 1 OBESITY DUE TO EXCESS CALORIES WITH BODY MASS INDEX (BMI) OF 33.0 TO 33.9 IN ADULT, UNSPECIFIED WHETHER SERIOUS COMORBIDITY PRESENT: ICD-10-CM

## 2025-02-04 DIAGNOSIS — E66.811 CLASS 1 OBESITY DUE TO EXCESS CALORIES WITH SERIOUS COMORBIDITY AND BODY MASS INDEX (BMI) OF 34.0 TO 34.9 IN ADULT: Primary | ICD-10-CM

## 2025-02-04 PROCEDURE — 99204 OFFICE O/P NEW MOD 45 MIN: CPT | Performed by: INTERNAL MEDICINE

## 2025-02-04 NOTE — PROGRESS NOTES
Assessment/Plan     Norma Sanchez is 59 y.o. year old female  who comes in for consultation for assistance with weight management.     - Discussed options of HealthyCORE-Intensive Lifestyle Intervention Program, Very Low Calorie Diet-VLCD, and Conservative Program and the role of weight loss medications.  - Patient is interested in pursuing Conservative Program    -  Main drivers of patients elevated body weight -  genetics, excess calories, skipping meals, inadequate protein leading to poor satiety, and poor food quality    Class 1 obesity due to excess calories with serious comorbidity and body mass index (BMI) of 34.0 to 34.9 in adult  Antiobesity Medications/Medical --  Class I obesity BMI of 34.39 with prediabetes and hyperlipidemia  Patient would like to retry Wegovy.  A prescription was attempted through her primary care but patient states that her insurance has changed since February of this year.  Will submit a prescription for Wegovy  Oral medication options were reviewed:  Patient has tried phentermine in the 90s and had succeeded in losing some weight.  In case of Wegovy denial we will consider this medication a twelve-lead EKG has been ordered in anticipation  Patient has no contraindications to Topamax  She has tried metformin in the past with diarrhea  No contraindications to naltrexone or Wellbutrin but would prefer to avoid using Wellbutrin along with phentermine      Nutrition:    Meet with Rd to enhance protein intake and also have guidance regarding meal prepping and incorporating well-balanced meals with 2 snacks.  Patient states she meal preps for her boyfriend who goes to the gym regularly but not for herself.  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    Diagnoses and all orders for this visit:    Class 1 obesity due to excess calories with serious comorbidity and body mass index (BMI) of 34.0 to 34.9 in adult  -     Semaglutide-Weight Management (WEGOVY) 0.25 MG/0.5ML; Inject 0.5 mL (0.25 mg total) under the skin once a week    Class 1 obesity due to excess calories with body mass index (BMI) of 33.0 to 33.9 in adult, unspecified whether serious comorbidity present  -     Ambulatory Referral to Weight Management    EKG abnormalities  -     ECG 12 lead; Future          -In addition, please follow general recommendations below.          Return visit:  6-8 weeks        General Lifestyle recommendations:    Nutrition   -Avoid skipping meals. Avoid sugary beverages. At least 64oz of water daily.  Limit processed food, refined sugars and grain. Encourage  healthy choices for meals and snacks   -Focus on protein goals and non starchy fiber rich vegetables for satiety effect and to help support a calorie deficit.   - Emphasize portion control, well balanced macronutrient's (protein, carbohydrate, fat using MyPlate method )and adequate protein with each meal/snacks and distributing calories equally throughout the day along with.   -Advise starting the day with a protein breakfast   Behavioral/Stress   Food log via manan or provided paper log (manan options include www.[employer].com, sparkpeople.com, loseit.com, calorieking.com, Probiodrug). Encouraged mindful eating. Be sure to set aside time to eat, eat slowly, and savor your food. Consider meditation apps and/or taking a few minutes of mindfulness every AM. Understand the role of regarding the role of stress hormone  "cortisol in promoting weight gain and visceral fat accumulation. Weigh daily or atleast 2-3 times/ week  Physical Activity   Increase physical activity by 10 minutes daily. Gradually increase physical activity to a goal of 5 days per week for 30 minutes of MODERATE intensity ( should be able to pass the \"talk test\" but should not be able to sing. Target 150-300 minutes  PLUS 2 days per week of FULL BODY resistance training. Progression will be addressed at follow up visits. Encouraged contemplation regarding establishing a daily physical activity routine  - Resistance training along with increase protein intake is important to maintain and enhance metabolism  Sleep   Encourage sleep hygiene and importance of having adequate sleep duration at least > 6 hours to support response in weight loss efforts    Handouts provided :  [facility] program at [facility]  MyPlate and food quality  Food log resources, phone manan or paper journal  Antiobesity medications options     - Discussed at length and the role of weight loss medications and medication options   - Explained the importance of making lifestyle changes in addition to starting any anti-obesity medications if the  patient chooses.  -  Initial weight loss goal of 5-10% weight loss for improved health  - Weight loss can improve patient's co-morbid conditions and/or prevent weight-related complications.  - Weight is not at goal and patient has been unable to achieve a meaningful weight loss above 5% using various programs and tools for more than 6 months    Norma was seen today for consult.    Diagnoses and all orders for this visit:    Class 1 obesity due to excess calories with serious comorbidity and body mass index (BMI) of 34.0 to 34.9 in adult  -     Semaglutide-Weight Management (WEGOVY) 0.25 MG/0.5ML; Inject 0.5 mL (0.25 mg total) under the skin once a week    Class 1 obesity due to excess calories with body mass index (BMI) of 33.0 to 33.9 in adult, " unspecified whether serious comorbidity present  -     Ambulatory Referral to Weight Management    EKG abnormalities  -     ECG 12 lead; Future                Wegovy Instructions:    - Begin Wegovy 0.25 mg subcutaneously once a week. Dose changes may occur after 4 doses if medication is tolerated. You will be assessed prior to each dose change to make sure you are tolerating the medication well.  - Please message me when you have 2 pens left from the prescription so there are no lapses in treatment.  - Visit wegovy.com for further information/injection instructions.   -Please eat small frequent, low fat meals to help reduce nausea. Lemon water and saltine crackers may help with this. Avoid fried foods  - Try to stop eating before you feel full  - Side effects of Wegovy discussed: nausea, vomiting, diarrhea, and constipation. If you experience fever, nausea/vomiting, and pain radiating to your back this may be a sign of pancreatitis. Please go to the emergency room if this occurs.  -- Take precautions to avoid dehydration. Aim for 64-80 oz of fluids/day  - A bowel regimen including OTC stool softeners, fiber supplements to manage constipation is suggested  - Patient understands the side effects of the medication and proper administration. Patient agrees with the treatment plan and all questions were answered.    The most common side effects of Wegovy include nausea, diarrhea, vomiting, constipation, abdominal (stomach) pain, headache, fatigue, dyspepsia (indigestion), dizziness, abdominal distension, eructation (belching), hypoglycemia (low blood sugar) in patients with type 2 diabetes treated with other drugs,  flatulence (gas buildup), gastroenteritis (an intestinal infection) and gastroesophageal reflux disease (a type of digestive disorder).     Wegovy should not be used in patients with a personal or family history of medullary thyroid carcinoma or in patients with a rare condition called Multiple Endocrine  "Neoplasia syndrome type 2 (MEN 2).     Wegovy also contains warnings for inflammation of the pancreas (pancreatitis), gallbladder problems (including gallstones), low blood sugar, acute kidney injury, diabetic retinopathy (damage to the eye's retina), increased heart rate and suicidal behavior or thinking .  Patient denies personal or ffamily Hx of pancreatitis, MEN 2 tumors and medullary thyroid cancer. Patient understands these major side effects and agrees to start the medication.    Subcutaneous injection:  Inject subQ into the thigh, abdomen, or upper arm; rotate injection sites.  Administer at any time of day, with or without meals.  Administer separately from insulin (do not mix the products); may inject both medications in the same body region but not adjacent to each other.  The day of the weekly administration can be changed as long as the time between the 2 doses is at least 3 days (72 hours)  Administer a missed dose as soon as possible within 4 days (96 hours) of missed dose; if more than 4 days have passed, skip the missed dose and administer next dose on regularly scheduled day and resume regular once weekly dosing schedule         Total time spent reviewing chart, interviewing patient, examining patient, discussing plan, answering all questions, and documentin min, with >50% face-to-face time spent counseling patient on nonsurgical interventions for the treatment of excess weight. Discussed in detail nonsurgical options including intensive lifestyle intervention program, very low-calorie diet program and conservative program.  Discussed the role of weight loss medications.  Counseled patient on diet behavior and exercise modification for weight loss.        History of present illness/ Weight history       Onset--  Patient states that she has been struggling with her weight over the last 10 years.  She feels she is \"Stuck with old age\"  A prescription for Wegovy was attempted through her PCP but " was denied through her prior insurance.  Since then this has changed.  She has tried phentermine in the 90s with some weight loss and has tried metformin but had contributed to diarrhea put on Wegovy   She has some physical restrictions due to a prolapsed bladder requiring surgery in terms of weight lifting  PCP also attempted a prescription for Contrave but was denied by insurance    Fam hx of obesity- son      Previous Weight loss medications/Weight loss attempts:   Slim fast fad diets OTC diet pills , ? Prescription  Previously diet and exercise     Patient reports and other history-    Referred by PCP  Wt Readings from Last 30 Encounters:   02/04/25 85.3 kg (188 lb)   10/18/24 83.9 kg (185 lb)   07/23/24 83.9 kg (185 lb)   10/13/23 81.1 kg (178 lb 12.8 oz)   09/28/23 81.6 kg (180 lb)   09/15/23 81.6 kg (180 lb)   09/15/23 81.6 kg (180 lb)   08/23/23 84.4 kg (186 lb)   07/03/23 84.4 kg (186 lb)   05/31/23 82.6 kg (182 lb)   10/03/22 86.6 kg (191 lb)   09/09/22 86.6 kg (191 lb)   05/17/22 87.5 kg (193 lb)   04/21/22 87.5 kg (193 lb)   12/02/21 87.5 kg (193 lb)   01/21/20 87.5 kg (193 lb)   10/10/19 87.5 kg (193 lb)   05/10/19 87.5 kg (193 lb)   07/31/18 87.6 kg (193 lb 1.6 oz)   07/22/18 87 kg (191 lb 12.8 oz)   03/02/17 86.2 kg (190 lb)   12/10/15 79.8 kg (176 lb)   07/07/15 78.9 kg (174 lb)   03/26/15 68 kg (150 lb)   03/06/14 69.4 kg (153 lb)    BMI Readings from Last 30 Encounters:   02/04/25 34.39 kg/m²   12/04/24 33.84 kg/m²   10/18/24 33.84 kg/m²   07/23/24 33.84 kg/m²   10/13/23 32.70 kg/m²   09/28/23 32.92 kg/m²   09/15/23 32.92 kg/m²   09/15/23 32.92 kg/m²   08/23/23 34.02 kg/m²   07/03/23 34.02 kg/m²   05/31/23 33.29 kg/m²   10/03/22 34.93 kg/m²   09/09/22 34.93 kg/m²   05/17/22 35.30 kg/m²   04/21/22 35.30 kg/m²   12/02/21 35.30 kg/m²   01/21/20 35.30 kg/m²   10/10/19 35.30 kg/m²   05/10/19 35.30 kg/m²   07/31/18 35.32 kg/m²   07/22/18 35.08 kg/m²   03/02/17 34.75 kg/m²   12/10/15 31.68 kg/m²    07/07/15 31.32 kg/m²   03/26/15 27.00 kg/m²   14 27.54 kg/m²                   Lifestyle questionnaire       Diet recall:  B: Coffee  S: candy  L: Chicken nuggets and chips sandwich or a salad  S:  D: not hungry   S: none    Frequency Eating out x/ week:   never     Food behaviors--boredom eating and snacking/grazing     Trouble area of the day: at work    Beverages  Water-- 64 oz   Caffeine/tea-- 12 oz   SSB --none    Alcohol: 0 drinks/ week   Smoking: former      Social History     Substance and Sexual Activity   Alcohol Use No      Social History     Tobacco Use   Smoking Status Former    Types: E-Cigarettes    Quit date:     Years since quittin.1   Smokeless Tobacco Never   Tobacco Comments    vape      Social History     Substance and Sexual Activity   Drug Use No         Physical Activity --walks 6 to 10 miles a day at work lifts pulls  bands pushes    Sleep -- Stop bang: Score: 3 / 8  ; 8 hours of sleep per night    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  Obesity Class: 30.0-34.9- Obesity Class I  Goal weight: 150 lbs    Waist circumference (inches): 43.5 Inches    Colonoscopy: 2023   Mammogram: 2022    Medication considerations/contraindications     -Patient denies personal history of pancreatitis. Patient also denies personal and family history of medullary thyroid cancer, and multiple endocrine neoplasia type 2 (MEN 2 tumor). -Patient denies any history of kidney stones, seizures, or glaucoma, diabetic retinopathy, gall bladder disease, gastroparesis, hyperthyroidism.  -Denies Hx of CAD, PAD, palpitations, arrhythmia, uncontrolled hypertension  -Denies uncontrolled anxiety or depression, suicidal behavior or thinking ,  "insomnia or sleep disturbance.         Past medical history/past surgical history       Previous notes and records have been reviewed.    The following portions of the patient's history were reviewed and updated as appropriate: allergies, current medications, past family history, past medical history, past social history, past surgical history, and problem list.    Past Medical History:   Diagnosis Date    Anxiety     Psychiatric disorder     anxiety         Past Surgical History:   Procedure Laterality Date     SECTION      HYSTERECTOMY      MAMMO (HISTORICAL)  2022    TONSILLECTOMY               Family History   Problem Relation Age of Onset    Heart disease Mother     Heart attack Mother 57    Diabetes Mother     Diabetes Father     Heart disease Father     Atrial fibrillation Father     Snyder's esophagus Sister     Diabetes Maternal Grandmother     Diabetes Maternal Grandfather             Objective     /70   Pulse 84   Ht 5' 2\" (1.575 m)   Wt 85.3 kg (188 lb)   SpO2 98%   BMI 34.39 kg/m²     Review of Systems   Constitutional:  Negative for chills, fatigue and fever.   HENT:  Negative for ear pain and sore throat.    Eyes:  Negative for pain and visual disturbance.   Respiratory:  Negative for cough and shortness of breath.    Cardiovascular:  Negative for chest pain, palpitations and leg swelling.   Gastrointestinal:  Negative for abdominal pain, constipation, diarrhea, nausea and vomiting.   Genitourinary:  Negative for dysuria and hematuria.   Musculoskeletal:  Negative for arthralgias and back pain.   Skin:  Negative for color change and rash.   Neurological:  Negative for seizures, syncope and headaches.   Psychiatric/Behavioral:  Negative for dysphoric mood. The patient is not nervous/anxious.    All other systems reviewed and are negative.    Physical Exam  Vitals and nursing note reviewed.   Constitutional:       Appearance: Normal appearance.   HENT:      Head: Normocephalic. "   Pulmonary:      Effort: Pulmonary effort is normal.   Neurological:      General: No focal deficit present.      Mental Status: He is alert and oriented to person, place, and time.   Psychiatric:         Mood and Affect: Mood normal.         Behavior: Behavior normal.         Thought Content: Thought content normal.         Judgment: Judgment normal.         Medications       Current Outpatient Medications:     ALPRAZolam (XANAX) 0.5 mg tablet, TAKE 1 TABLET BY MOUTH ONCE DAILY AS NEEDED FOR ANXIETY, Disp: 30 tablet, Rfl: 0    escitalopram (LEXAPRO) 10 mg tablet, Take 1 tablet (10 mg total) by mouth daily at bedtime, Disp: 30 tablet, Rfl: 3    pravastatin (PRAVACHOL) 10 mg tablet, Take 1 tablet by mouth once daily, Disp: 30 tablet, Rfl: 0    Semaglutide-Weight Management (WEGOVY) 0.25 MG/0.5ML, Inject 0.5 mL (0.25 mg total) under the skin once a week, Disp: 2 mL, Rfl: 0           Labs and imaging     Recent labs and imaging have been personally reviewed.  Lab Results   Component Value Date    WBC 7.4 09/21/2023    HGB 12.6 09/21/2023    HCT 38.2 09/21/2023    MCV 87.0 09/21/2023     09/21/2023     Lab Results   Component Value Date    SODIUM 142 11/19/2024    K 4.4 11/19/2024     11/19/2024    CO2 24 11/19/2024    AGAP 8 07/30/2018    BUN 10 11/19/2024    CREATININE 0.49 (L) 11/19/2024    GLUC 81 11/19/2024    CALCIUM 9.0 09/21/2023    AST 25 11/19/2024    ALT 22 11/19/2024    ALKPHOS 48 09/21/2023    TP 7.1 11/19/2024    TBILI 0.3 11/19/2024    EGFR 110 11/19/2024     Lab Results   Component Value Date    HGBA1C 6.0 (H) 11/19/2024     Lab Results   Component Value Date    EPQ5JFVXVRJH 1.870 07/30/2018     Lab Results   Component Value Date    CHOLESTEROL 244 (H) 11/19/2024     Lab Results   Component Value Date    HDL 51 11/19/2024     Lab Results   Component Value Date    TRIG 135 11/19/2024     Lab Results   Component Value Date    LDLCALC 169 (H) 11/19/2024     No results found for:  "\"[id number]\", \"LABINSU\"                  "

## 2025-02-04 NOTE — ASSESSMENT & PLAN NOTE
Antiobesity Medications/Medical --  Class I obesity BMI of 34.39 with prediabetes and hyperlipidemia  Patient would like to retry Wegovy.  A prescription was attempted through her primary care but patient states that her insurance has changed since February of this year.  Will submit a prescription for Wegovy  Oral medication options were reviewed:  Patient has tried phentermine in the 90s and had succeeded in losing some weight.  In case of Wegovy denial we will consider this medication a twelve-lead EKG has been ordered in anticipation  Patient has no contraindications to Topamax  She has tried metformin in the past with diarrhea  No contraindications to naltrexone or Wellbutrin but would prefer to avoid using Wellbutrin along with phentermine      Nutrition:    Meet with Rd to enhance protein intake and also have guidance regarding meal prepping and incorporating well-balanced meals with 2 snacks.  Patient states she meal preps for her boyfriend who goes to the gym regularly but not for herself.  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

## 2025-02-06 ENCOUNTER — TELEPHONE (OUTPATIENT)
Dept: BARIATRICS | Facility: CLINIC | Age: 60
End: 2025-02-06

## 2025-02-07 DIAGNOSIS — E78.2 MIXED HYPERLIPIDEMIA: ICD-10-CM

## 2025-02-07 RX ORDER — PRAVASTATIN SODIUM 10 MG
10 TABLET ORAL DAILY
Qty: 30 TABLET | Refills: 5 | Status: SHIPPED | OUTPATIENT
Start: 2025-02-07

## 2025-02-07 NOTE — TELEPHONE ENCOUNTER
Reason for call:   [x] Refill   [] Prior Auth  [] Other:     Office:   [x] PCP/Provider - Tea Medel MD   [] Specialty/Provider -     Medication:     pravastatin (PRAVACHOL) 10 mg tablet       Dose/Frequency: 10 mg, Oral, Daily     Quantity: 30    Pharmacy: [facility] [address] [place] [address] - [address]     Does the patient have enough for 3 days?   [x] Yes   [] No - Send as HP to POD

## 2025-02-18 ENCOUNTER — TELEPHONE (OUTPATIENT)
Dept: INTERNAL MEDICINE CLINIC | Facility: CLINIC | Age: 60
End: 2025-02-18

## 2025-02-18 NOTE — TELEPHONE ENCOUNTER
Called patient regarding moving 3/18/25 appointment . Provider's schedule changed and last appointment on Tuesdays is now 12 noon

## 2025-03-18 DIAGNOSIS — F32.A ANXIETY AND DEPRESSION: ICD-10-CM

## 2025-03-18 DIAGNOSIS — F41.9 ANXIETY AND DEPRESSION: ICD-10-CM

## 2025-03-18 DIAGNOSIS — E78.2 MIXED HYPERLIPIDEMIA: ICD-10-CM

## 2025-03-18 NOTE — TELEPHONE ENCOUNTER
Pt requested a refill for the pravastatin (PRAVACHOL) 10 mg tablet  and escitalopram (LEXAPRO) 10 mg tablet.     Please send to:   [facility] - [address]      Thank you for your help.

## 2025-03-19 RX ORDER — ESCITALOPRAM OXALATE 10 MG/1
10 TABLET ORAL
Qty: 30 TABLET | Refills: 3 | Status: SHIPPED | OUTPATIENT
Start: 2025-03-19

## 2025-03-19 RX ORDER — PRAVASTATIN SODIUM 10 MG
10 TABLET ORAL DAILY
Qty: 30 TABLET | Refills: 5 | Status: SHIPPED | OUTPATIENT
Start: 2025-03-19

## 2025-04-09 LAB
CHOLEST SERPL-MCNC: 222 MG/DL
CHOLEST/HDLC SERPL: 4.3 (CALC)
HDLC SERPL-MCNC: 52 MG/DL
LDLC SERPL CALC-MCNC: 143 MG/DL (CALC)
NONHDLC SERPL-MCNC: 170 MG/DL (CALC)
TRIGL SERPL-MCNC: 145 MG/DL

## 2025-04-14 ENCOUNTER — OFFICE VISIT (OUTPATIENT)
Dept: INTERNAL MEDICINE CLINIC | Facility: CLINIC | Age: 60
End: 2025-04-14
Payer: COMMERCIAL

## 2025-04-14 VITALS
SYSTOLIC BLOOD PRESSURE: 108 MMHG | BODY MASS INDEX: 35.7 KG/M2 | OXYGEN SATURATION: 95 % | WEIGHT: 194 LBS | HEIGHT: 62 IN | HEART RATE: 73 BPM | DIASTOLIC BLOOD PRESSURE: 70 MMHG

## 2025-04-14 DIAGNOSIS — E66.01 CLASS 2 SEVERE OBESITY DUE TO EXCESS CALORIES WITH SERIOUS COMORBIDITY AND BODY MASS INDEX (BMI) OF 35.0 TO 35.9 IN ADULT (HCC): Primary | ICD-10-CM

## 2025-04-14 DIAGNOSIS — E78.2 MIXED HYPERLIPIDEMIA: ICD-10-CM

## 2025-04-14 DIAGNOSIS — E66.812 CLASS 2 SEVERE OBESITY DUE TO EXCESS CALORIES WITH SERIOUS COMORBIDITY AND BODY MASS INDEX (BMI) OF 35.0 TO 35.9 IN ADULT (HCC): Primary | ICD-10-CM

## 2025-04-14 DIAGNOSIS — R73.03 PREDIABETES: ICD-10-CM

## 2025-04-14 PROCEDURE — 99214 OFFICE O/P EST MOD 30 MIN: CPT

## 2025-04-14 RX ORDER — PRAVASTATIN SODIUM 20 MG
20 TABLET ORAL DAILY
Qty: 90 TABLET | Refills: 1 | Status: SHIPPED | OUTPATIENT
Start: 2025-04-14

## 2025-04-14 NOTE — ASSESSMENT & PLAN NOTE
Prior Authorization Clinical Questions for Weight Management Pharmacotherapy    1. Does the patient have a contrainidcation to medication prescribed for weight management?: No  2. Does the patient have a diagnosis of obesity, confirmed by a BMI greater than or equal to 30 kg/m^2?: Yes  3. Does the patient have a BMI of greater than or equal to 27 kg/m^2 with at least one weight-related comorbidity/risk factor/complication (e.g. diabetes, dyslipidemia, coronary artery disease)?: Yes  4. Weight-related co-morbidities/risk factors: prediabetes, dyslipidemia, depression  5. WEGOVY CVA Indication: Does patient have established documented cardiovascular disease (history of a prior heart attack (myocardial infarction), stroke, or symptomatic peripheral arterial disease (PAD)?: N/A  6. ZEPBOUND SHARONDA Indication: Does patient have documented SHARONDA diagnosed via sleep study (insurance will require copy of sleep study results for approval)?: N/A  7. Has the patient been on a weight loss regimen of low-calorie diet, increased physical activity, and lifestyle modifications for a minimum of 6 months?: Yes  8. Has the patient completed a comprehensive weight loss program (ie, Weight Watchers, Noom, Bariatrics, other manan on phone)? If so, what?: No  9. Does the patient have a history of type 2 diabetes?: No  10. Has the member tried and failed other weight loss medication within the past 12 months?: No  11. Will the member use requested medication in combination with another GLP agonist or weight loss drug?: No  12. Is the medication a controlled substance?: No     Baseline weight (in pounds): 194 lbs         Orders:  •  Semaglutide-Weight Management (WEGOVY) 0.25 MG/0.5ML; Inject 0.5 mL (0.25 mg total) under the skin once a week for 28 days  •  Semaglutide-Weight Management (WEGOVY) 0.5 MG/0.5ML; Inject 0.5 mL (0.5 mg total) under the skin once a week for 28 days Do not start before May 10, 2025.  •  Semaglutide-Weight Management  (WEGOVY) 1 MG/0.5ML; Inject 0.5 mL (1 mg total) under the skin once a week for 28 days Do not start before June 7, 2025.  •  Semaglutide-Weight Management (WEGOVY) 1.7 MG/0.75ML; Inject 0.75 mL (1.7 mg total) under the skin once a week for 28 days Do not start before July 5, 2025.  •  Semaglutide-Weight Management (WEGOVY) 2.4 MG/0.75ML; Inject 0.75 mL (2.4 mg total) under the skin once a week for 28 days Do not start before August 2, 2025.

## 2025-04-14 NOTE — ASSESSMENT & PLAN NOTE
Lab Results   Component Value Date    HGBA1C 6.0 (H) 11/19/2024     Recommend lifestyle modifications including low carb/low sugar diet, increased physical activity/exercise and weight loss  Will continue to monitor  Repeat A1c labs  Orders:  •  Hemoglobin A1C W/EAG With Reflex To Glycomark(R); Future

## 2025-04-14 NOTE — PROGRESS NOTES
Name: Norma Sanchez      : 1965      MRN: [id number]  Encounter Provider: Tea Medel MD  Encounter Date: 2025   Encounter department: [facility] INTERNAL MEDICINE  :  Assessment & Plan  Class 2 severe obesity due to excess calories with serious comorbidity and body mass index (BMI) of 35.0 to 35.9 in adult (HCC)  Prior Authorization Clinical Questions for Weight Management Pharmacotherapy    1. Does the patient have a contrainidcation to medication prescribed for weight management?: No  2. Does the patient have a diagnosis of obesity, confirmed by a BMI greater than or equal to 30 kg/m^2?: Yes  3. Does the patient have a BMI of greater than or equal to 27 kg/m^2 with at least one weight-related comorbidity/risk factor/complication (e.g. diabetes, dyslipidemia, coronary artery disease)?: Yes  4. Weight-related co-morbidities/risk factors: prediabetes, dyslipidemia, depression  5. WEGOVY CVA Indication: Does patient have established documented cardiovascular disease (history of a prior heart attack (myocardial infarction), stroke, or symptomatic peripheral arterial disease (PAD)?: N/A  6. ZEPBOUND SHARONDA Indication: Does patient have documented SHARONDA diagnosed via sleep study (insurance will require copy of sleep study results for approval)?: N/A  7. Has the patient been on a weight loss regimen of low-calorie diet, increased physical activity, and lifestyle modifications for a minimum of 6 months?: Yes  8. Has the patient completed a comprehensive weight loss program (ie, Weight Watchers, Noom, Bariatrics, other manan on phone)? If so, what?: No  9. Does the patient have a history of type 2 diabetes?: No  10. Has the member tried and failed other weight loss medication within the past 12 months?: No  11. Will the member use requested medication in combination with another GLP agonist or weight loss drug?: No  12. Is the medication a controlled substance?: No     Baseline weight (in pounds):  194 lbs         Orders:  •  Semaglutide-Weight Management (WEGOVY) 0.25 MG/0.5ML; Inject 0.5 mL (0.25 mg total) under the skin once a week for 28 days  •  Semaglutide-Weight Management (WEGOVY) 0.5 MG/0.5ML; Inject 0.5 mL (0.5 mg total) under the skin once a week for 28 days Do not start before May 10, 2025.  •  Semaglutide-Weight Management (WEGOVY) 1 MG/0.5ML; Inject 0.5 mL (1 mg total) under the skin once a week for 28 days Do not start before June 7, 2025.  •  Semaglutide-Weight Management (WEGOVY) 1.7 MG/0.75ML; Inject 0.75 mL (1.7 mg total) under the skin once a week for 28 days Do not start before July 5, 2025.  •  Semaglutide-Weight Management (WEGOVY) 2.4 MG/0.75ML; Inject 0.75 mL (2.4 mg total) under the skin once a week for 28 days Do not start before August 2, 2025.    Mixed hyperlipidemia  Lab Results   Component Value Date    CHOLESTEROL 222 (H) 04/08/2025    TRIG 145 04/08/2025    HDL 52 04/08/2025    LDLCALC 143 (H) 04/08/2025     Increase pravastatin to 20 mg daily  Recommend lifestyle modifications including low fat diet, increased physical activity/exercise and weight loss  Will continue to monitor  Repeat lipid panel in 3 months  Orders:  •  pravastatin (PRAVACHOL) 20 mg tablet; Take 1 tablet (20 mg total) by mouth daily  •  Lipid panel; Future    Prediabetes  Lab Results   Component Value Date    HGBA1C 6.0 (H) 11/19/2024     Recommend lifestyle modifications including low carb/low sugar diet, increased physical activity/exercise and weight loss  Will continue to monitor  Repeat A1c labs  Orders:  •  Hemoglobin A1C W/EAG With Reflex To Glycomark(R); Future      Return in about 3 months (around 7/14/2025) for Recheck weight, prediabetes and HLD.     History of Present Illness   HPI  A 60 year old female patient with a history of obesity, prediabetes and hypercholesterolemia, presents for a follow-up visit for continued management of chronic medical conditions.     The patient's primary concern is  "continuing her weight loss journey. She emphasizes the need to lose weight to relieve pressure on her bladder and potentially avoid surgery. Continues with lifestyle interventions without noticeable results.     She has switched insurance providers and is seeking assistance in obtaining coverage for Wegovy or exploring alternative options like Contrave.    Regarding her hypercholesterolemia, her cholesterol has improved after starting statin therapy.    Also shares concern for presence of multiple skin tags that have become bothersome and she would like them removed.      Review of Systems   Constitutional:  Positive for fatigue. Negative for chills and fever.   HENT:  Negative for congestion and rhinorrhea.    Eyes:  Negative for visual disturbance.   Respiratory:  Negative for shortness of breath.    Cardiovascular:  Negative for chest pain.   Gastrointestinal:  Negative for abdominal pain.   Genitourinary:  Positive for frequency. Negative for dysuria.   Musculoskeletal:  Positive for arthralgias.   Skin:  Negative for rash.   Neurological:  Negative for dizziness and headaches.   Psychiatric/Behavioral:  Negative for confusion, dysphoric mood and sleep disturbance. The patient is nervous/anxious.        Objective   /70   Pulse 73   Ht 5' 2\" (1.575 m)   Wt 88 kg (194 lb)   SpO2 95%   BMI 35.48 kg/m²      Physical Exam  Vitals and nursing note reviewed.   Constitutional:       General: She is not in acute distress.     Appearance: Normal appearance. She is obese. She is not ill-appearing or toxic-appearing.      Comments: Body mass index is 35.48 kg/m².   HENT:      Head: Normocephalic.   Eyes:      General:         Right eye: No discharge.         Left eye: No discharge.      Extraocular Movements: Extraocular movements intact.      Conjunctiva/sclera: Conjunctivae normal.   Cardiovascular:      Rate and Rhythm: Normal rate.      Pulses: Normal pulses.   Pulmonary:      Effort: Pulmonary effort is " normal. No respiratory distress.   Musculoskeletal:         General: Normal range of motion.      Cervical back: Normal range of motion.   Skin:     General: Skin is warm and dry.   Neurological:      Mental Status: She is alert and oriented to person, place, and time.   Psychiatric:         Mood and Affect: Mood is anxious.         Behavior: Behavior normal.

## 2025-04-14 NOTE — ASSESSMENT & PLAN NOTE
Lab Results   Component Value Date    CHOLESTEROL 222 (H) 04/08/2025    TRIG 145 04/08/2025    HDL 52 04/08/2025    LDLCALC 143 (H) 04/08/2025     Increase pravastatin to 20 mg daily  Recommend lifestyle modifications including low fat diet, increased physical activity/exercise and weight loss  Will continue to monitor  Repeat lipid panel in 3 months  Orders:  •  pravastatin (PRAVACHOL) 20 mg tablet; Take 1 tablet (20 mg total) by mouth daily  •  Lipid panel; Future   [Negative] : Heme/Lymph

## 2025-04-15 ENCOUNTER — TELEPHONE (OUTPATIENT)
Age: 60
End: 2025-04-15

## 2025-04-15 NOTE — TELEPHONE ENCOUNTER
MILY Taveras 0.25MG/0.5ML SUBMITTED    to [employer]      via    [x]CMM-KEY: [id number]  []Surescripts-Case ID #    []Availity-Auth ID #  NDC #    []Faxed to plan   []Other website    []Phone call Case ID #      []PA sent as URGENT    All office notes, labs and other pertaining documents and studies sent. Clinical questions answered. Awaiting determination from insurance company.     Turnaround time for your insurance to make a decision on your Prior Authorization can take 7-21 business days.

## 2025-05-06 ENCOUNTER — TELEPHONE (OUTPATIENT)
Age: 60
End: 2025-05-06

## 2025-05-06 NOTE — TELEPHONE ENCOUNTER
Patient called in stating her insurance will not cover wegovy.  Asking if Dr. Medel could call in a different medication that will hopefull be covered.  Please advise.

## 2025-05-06 NOTE — TELEPHONE ENCOUNTER
Patient cancelled follow up appointment today because she never started the medication. She just got a denial letter. She said she will reschedule once she starts taking medication.  Sent message to clinical team to reach out to patient with options.

## 2025-05-06 NOTE — TELEPHONE ENCOUNTER
Patient notified. This class  of injectables is excluded from her plan. She should discuss other options with weight management.

## 2025-05-22 ENCOUNTER — TELEPHONE (OUTPATIENT)
Dept: INTERNAL MEDICINE CLINIC | Facility: CLINIC | Age: 60
End: 2025-05-22

## 2025-06-12 ENCOUNTER — TELEPHONE (OUTPATIENT)
Dept: INTERNAL MEDICINE CLINIC | Facility: CLINIC | Age: 60
End: 2025-06-12

## 2025-06-28 LAB
ALBUMIN SERPL-MCNC: 4.2 G/DL (ref 3.6–5.1)
ALBUMIN/GLOB SERPL: 1.6 (CALC) (ref 1–2.5)
ALP SERPL-CCNC: 39 U/L (ref 37–153)
ALT SERPL-CCNC: 23 U/L (ref 6–29)
AST SERPL-CCNC: 26 U/L (ref 10–35)
BILIRUB SERPL-MCNC: 0.3 MG/DL (ref 0.2–1.2)
BUN SERPL-MCNC: 11 MG/DL (ref 7–25)
BUN/CREAT SERPL: 26 (CALC) (ref 6–22)
CALCIUM SERPL-MCNC: 9 MG/DL (ref 8.6–10.4)
CHLORIDE SERPL-SCNC: 106 MMOL/L (ref 98–110)
CHOLEST SERPL-MCNC: 190 MG/DL
CHOLEST/HDLC SERPL: 4 (CALC)
CO2 SERPL-SCNC: 28 MMOL/L (ref 20–32)
CREAT SERPL-MCNC: 0.43 MG/DL (ref 0.5–1.03)
GFR/BSA.PRED SERPLBLD CYS-BASED-ARV: 113 ML/MIN/1.73M2
GLOBULIN SER CALC-MCNC: 2.7 G/DL (CALC) (ref 1.9–3.7)
GLUCOSE SERPL-MCNC: 89 MG/DL (ref 65–99)
HDLC SERPL-MCNC: 47 MG/DL
LDLC SERPL CALC-MCNC: 113 MG/DL (CALC)
NONHDLC SERPL-MCNC: 143 MG/DL (CALC)
POTASSIUM SERPL-SCNC: 4.3 MMOL/L (ref 3.5–5.3)
PROT SERPL-MCNC: 6.9 G/DL (ref 6.1–8.1)
SODIUM SERPL-SCNC: 141 MMOL/L (ref 135–146)
TRIGL SERPL-MCNC: 180 MG/DL

## 2025-07-02 DIAGNOSIS — F32.A ANXIETY AND DEPRESSION: ICD-10-CM

## 2025-07-02 DIAGNOSIS — F41.9 ANXIETY AND DEPRESSION: ICD-10-CM

## 2025-07-02 RX ORDER — ESCITALOPRAM OXALATE 10 MG/1
10 TABLET ORAL
Qty: 30 TABLET | Refills: 5 | Status: SHIPPED | OUTPATIENT
Start: 2025-07-02

## 2025-07-02 NOTE — TELEPHONE ENCOUNTER
Reason for call:   [x] Refill   [] Prior Auth  [] Other:     Office:   [] PCP/Provider -   [] Specialty/Provider -     Medication: escitalopram (LEXAPRO) 10 mg tablet     Dose/Frequency: Take 1 tablet (10 mg total) by mouth daily at bedtime     Quantity: 30    Pharmacy: [facility]   Does the patient have enough for 3 days?   [] Yes   [x] No - Send as HP to POD    Mail Away Pharmacy   Does the patient have enough for 10 days?   [] Yes   [] No - Send as HP to POD

## 2025-07-21 ENCOUNTER — OFFICE VISIT (OUTPATIENT)
Age: 60
End: 2025-07-21
Payer: COMMERCIAL

## 2025-07-21 VITALS
WEIGHT: 193 LBS | BODY MASS INDEX: 35.51 KG/M2 | SYSTOLIC BLOOD PRESSURE: 132 MMHG | HEART RATE: 82 BPM | OXYGEN SATURATION: 96 % | HEIGHT: 62 IN | DIASTOLIC BLOOD PRESSURE: 80 MMHG

## 2025-07-21 DIAGNOSIS — E55.9 VITAMIN D DEFICIENCY: ICD-10-CM

## 2025-07-21 DIAGNOSIS — F41.9 ANXIETY AND DEPRESSION: ICD-10-CM

## 2025-07-21 DIAGNOSIS — E66.01 CLASS 2 SEVERE OBESITY DUE TO EXCESS CALORIES WITH SERIOUS COMORBIDITY AND BODY MASS INDEX (BMI) OF 35.0 TO 35.9 IN ADULT (HCC): Primary | ICD-10-CM

## 2025-07-21 DIAGNOSIS — F32.A ANXIETY AND DEPRESSION: ICD-10-CM

## 2025-07-21 DIAGNOSIS — R73.03 PREDIABETES: ICD-10-CM

## 2025-07-21 DIAGNOSIS — E66.812 CLASS 2 SEVERE OBESITY DUE TO EXCESS CALORIES WITH SERIOUS COMORBIDITY AND BODY MASS INDEX (BMI) OF 35.0 TO 35.9 IN ADULT (HCC): Primary | ICD-10-CM

## 2025-07-21 DIAGNOSIS — E78.2 MIXED HYPERLIPIDEMIA: ICD-10-CM

## 2025-07-21 PROCEDURE — 99213 OFFICE O/P EST LOW 20 MIN: CPT

## 2025-07-21 RX ORDER — ROSUVASTATIN CALCIUM 10 MG/1
10 TABLET, COATED ORAL DAILY
Qty: 30 TABLET | Refills: 2 | Status: SHIPPED | OUTPATIENT
Start: 2025-07-21 | End: 2025-07-23

## 2025-07-21 RX ORDER — ESCITALOPRAM OXALATE 10 MG/1
10 TABLET ORAL
Qty: 90 TABLET | Refills: 1 | Status: SHIPPED | OUTPATIENT
Start: 2025-07-21

## 2025-07-21 RX ORDER — ALPRAZOLAM 0.5 MG
0.5 TABLET ORAL
Qty: 30 TABLET | Refills: 0 | Status: SHIPPED | OUTPATIENT
Start: 2025-07-21

## 2025-07-21 NOTE — ASSESSMENT & PLAN NOTE
Improving  Previously started on pravastatin and lieu of Crestor due to insurance preference; but now with change in insurance again, will resend prescription for Crestor  Orders:  •  rosuvastatin (CRESTOR) 10 MG tablet; Take 1 tablet (10 mg total) by mouth daily  •  Lipid panel; Future

## 2025-07-21 NOTE — ASSESSMENT & PLAN NOTE
Continue with lifestyle modifications including low carb/low sugar diet, increased physical activity/exercise and weight loss  Will continue to monitor  Repeat A1c  Orders:  •  Comprehensive metabolic panel; Future  •  Hemoglobin A1C With EAG; Future

## 2025-07-21 NOTE — ASSESSMENT & PLAN NOTE
Stable on current regimen  Continue Lexapro daily and Xanax as needed  Orders:  •  escitalopram (LEXAPRO) 10 mg tablet; Take 1 tablet (10 mg total) by mouth daily at bedtime  •  ALPRAZolam (XANAX) 0.5 mg tablet; Take 1 tablet (0.5 mg total) by mouth daily at bedtime as needed for anxiety  •  TSH, 3rd generation with Free T4 reflex; Future

## 2025-07-21 NOTE — PROGRESS NOTES
Name: Norma Sanchez      : 1965      MRN: [id number]  Encounter Provider: Tea Medel MD  Encounter Date: 2025   Encounter department: [facility] PR[facility] C[facility]  :  Assessment & Plan  Class 2 severe obesity due to excess calories with serious comorbidity and body mass index (BMI) of 35.0 to 35.9 in adult (HCC)  Recommend incorporating a more whole foods plant-predominant diet along with decreasing consumption of red meats and processed foods  Per AHA guidelines, recommend moderate-vigorous intensity exercise for 30 minutes a day for 5 days a week or a total of 150 min/week  Orders:  •  CBC and differential; Future  •  Comprehensive metabolic panel; Future    Mixed hyperlipidemia  Improving  Previously started on pravastatin and lieu of Crestor due to insurance preference; but now with change in insurance again, will resend prescription for Crestor  Orders:  •  rosuvastatin (CRESTOR) 10 MG tablet; Take 1 tablet (10 mg total) by mouth daily  •  Lipid panel; Future    Prediabetes  Continue with lifestyle modifications including low carb/low sugar diet, increased physical activity/exercise and weight loss  Will continue to monitor  Repeat A1c  Orders:  •  Comprehensive metabolic panel; Future  •  Hemoglobin A1C With EAG; Future    Anxiety and depression  Stable on current regimen  Continue Lexapro daily and Xanax as needed  Orders:  •  escitalopram (LEXAPRO) 10 mg tablet; Take 1 tablet (10 mg total) by mouth daily at bedtime  •  ALPRAZolam (XANAX) 0.5 mg tablet; Take 1 tablet (0.5 mg total) by mouth daily at bedtime as needed for anxiety  •  TSH, 3rd generation with Free T4 reflex; Future    Vitamin D deficiency    Orders:  •  Vitamin D 25 hydroxy; Future      Return in about 6 months (around 2026) for Annual physical.       History of Present Illness   HPI  Presents as a follow-up visit for continued management of chronic conditions.    No new concerns at this time.    Review of Systems  "  Constitutional:  Negative for chills and fever.   HENT:  Negative for congestion and rhinorrhea.    Eyes:  Negative for visual disturbance.   Respiratory:  Negative for shortness of breath.    Cardiovascular:  Negative for chest pain.   Gastrointestinal:  Negative for abdominal pain.   Genitourinary:  Positive for frequency. Negative for dysuria.   Musculoskeletal:  Positive for arthralgias.   Skin:  Negative for rash.   Neurological:  Negative for dizziness and headaches.   Psychiatric/Behavioral:  Negative for confusion, dysphoric mood and sleep disturbance. The patient is nervous/anxious.        Objective   /80   Pulse 82   Ht 5' 2\" (1.575 m)   Wt 87.5 kg (193 lb)   SpO2 96%   BMI 35.30 kg/m²      Physical Exam  Vitals and nursing note reviewed.   Constitutional:       General: She is not in acute distress.     Appearance: Normal appearance. She is not ill-appearing or toxic-appearing.   HENT:      Head: Normocephalic.     Eyes:      General:         Right eye: No discharge.         Left eye: No discharge.      Extraocular Movements: Extraocular movements intact.      Conjunctiva/sclera: Conjunctivae normal.       Cardiovascular:      Rate and Rhythm: Normal rate and regular rhythm.      Pulses: Normal pulses.      Heart sounds: Normal heart sounds.   Pulmonary:      Effort: Pulmonary effort is normal. No respiratory distress.      Breath sounds: Normal breath sounds.     Musculoskeletal:         General: Normal range of motion.      Cervical back: Normal range of motion.      Right lower leg: No edema.      Left lower leg: No edema.     Skin:     General: Skin is warm and dry.     Neurological:      Mental Status: She is alert and oriented to person, place, and time.     Psychiatric:         Behavior: Behavior normal.         "

## 2025-07-23 RX ORDER — ROSUVASTATIN CALCIUM 10 MG/1
10 TABLET, COATED ORAL DAILY
Qty: 30 TABLET | Refills: 2 | Status: SHIPPED | OUTPATIENT
Start: 2025-07-23

## 2025-08-06 DIAGNOSIS — F41.9 ANXIETY AND DEPRESSION: ICD-10-CM

## 2025-08-06 DIAGNOSIS — F32.A ANXIETY AND DEPRESSION: ICD-10-CM

## 2025-08-06 DIAGNOSIS — E78.2 MIXED HYPERLIPIDEMIA: ICD-10-CM

## 2025-08-07 RX ORDER — ESCITALOPRAM OXALATE 10 MG/1
10 TABLET ORAL
Qty: 90 TABLET | Refills: 1 | Status: SHIPPED | OUTPATIENT
Start: 2025-08-07

## 2025-08-07 RX ORDER — ROSUVASTATIN CALCIUM 10 MG/1
10 TABLET, COATED ORAL DAILY
Qty: 90 TABLET | Refills: 1 | Status: SHIPPED | OUTPATIENT
Start: 2025-08-07